# Patient Record
Sex: MALE | Race: WHITE | Employment: OTHER | URBAN - METROPOLITAN AREA
[De-identification: names, ages, dates, MRNs, and addresses within clinical notes are randomized per-mention and may not be internally consistent; named-entity substitution may affect disease eponyms.]

---

## 2017-03-28 ENCOUNTER — HOSPITAL ENCOUNTER (OUTPATIENT)
Dept: LAB | Age: 77
Discharge: HOME | End: 2017-03-28

## 2017-03-28 ENCOUNTER — OFFICE VISIT (OUTPATIENT)
Dept: DERMATOLOGY | Facility: AMBULATORY SURGERY CENTER | Age: 77
End: 2017-03-28

## 2017-03-28 VITALS
HEART RATE: 64 BPM | OXYGEN SATURATION: 97 % | HEIGHT: 70 IN | SYSTOLIC BLOOD PRESSURE: 104 MMHG | WEIGHT: 195 LBS | RESPIRATION RATE: 20 BRPM | DIASTOLIC BLOOD PRESSURE: 62 MMHG | BODY MASS INDEX: 27.92 KG/M2 | TEMPERATURE: 98.2 F

## 2017-03-28 DIAGNOSIS — D18.01 CHERRY ANGIOMA: ICD-10-CM

## 2017-03-28 DIAGNOSIS — D48.5 NEOPLASM OF UNCERTAIN BEHAVIOR OF SKIN OF NECK: ICD-10-CM

## 2017-03-28 DIAGNOSIS — L57.0 ACTINIC KERATOSIS: Primary | ICD-10-CM

## 2017-03-28 DIAGNOSIS — L81.4 LENTIGINES: ICD-10-CM

## 2017-03-28 DIAGNOSIS — L82.1 SEBORRHEIC KERATOSES: ICD-10-CM

## 2017-03-28 DIAGNOSIS — D22.9 MULTIPLE BENIGN NEVI: ICD-10-CM

## 2017-03-28 DIAGNOSIS — D48.5 NEOPLASM OF UNCERTAIN BEHAVIOR OF SKIN OF BACK: ICD-10-CM

## 2017-03-28 DIAGNOSIS — L90.5 SCAR CONDITION AND FIBROSIS OF SKIN: ICD-10-CM

## 2017-03-28 DIAGNOSIS — Z85.828 HISTORY OF NONMELANOMA SKIN CANCER: ICD-10-CM

## 2017-03-28 RX ORDER — FLUOROURACIL 5 MG/G
CREAM TOPICAL DAILY
Qty: 60 G | Refills: 1 | Status: SHIPPED | OUTPATIENT
Start: 2017-03-28 | End: 2017-09-05

## 2017-03-28 NOTE — PROGRESS NOTES
Name: Jose Radford       Age: 69 y.o. Date: 3/28/2017    Chief Complaint:   Chief Complaint   Patient presents with    Skin Exam     6 Month       Subjective:    HPI  Mr. Jose Radford is a 69 y.o. male who presents for a full skin exam.  The patient's last skin exam was 6 months ago and the patient does have current complaints related to his skin. He reports multiple red areas on his forehead and sometimes scaly does not have any associated sensitivity. He is unaware of any other concerning lesions today. Patient has a history of multiple nonmelanoma skin cancers treated with various methods including Mohs surgery and curettage Mr. Jose Radford is feeling well and in his usual state of health today. The patient's pertinent skin history includes :.  History of actinic keratosis and completed photodynamic therapy February-2016. Multiple nonmelanoma skin cancers. ROS: Constitutional: Negative. Dermatological : positive for - skin lesion changes      Social History     Social History    Marital status:      Spouse name: N/A    Number of children: N/A    Years of education: N/A     Occupational History    Not on file.      Social History Main Topics    Smoking status: Former Smoker     Quit date: 6/7/1972    Smokeless tobacco: Never Used    Alcohol use No    Drug use: Not on file    Sexual activity: Not on file     Other Topics Concern    Not on file     Social History Narrative       Family History   Problem Relation Age of Onset    Cancer Sister      bladder    Cancer Brother      colon cancer    Cancer Other      breast cancer       Past Medical History:   Diagnosis Date    Hypertension     Skin cancer     BCC face, neck       Past Surgical History:   Procedure Laterality Date    HX ORTHOPAEDIC Bilateral     hip replacement    HX ORTHOPAEDIC Left     rotator cuff repair       Current Outpatient Prescriptions   Medication Sig Dispense Refill    fluoruracil (CARAC) 0.5 % topical cream Apply  to affected area daily. Apply to lesions as directed 60 g 1    CLONIDINE HCL PO Take  by mouth.  multivitamin (ONE A DAY) tablet Take 1 Tab by mouth daily.  amLODIPine-benazepril (LOTREL) 10-20 mg per capsule Take 1 Cap by mouth daily.  FLUoxetine (PROZAC) 10 mg capsule Take  by mouth daily.  guanFACINE (TENEX) 1 mg tablet Take 1 mg by mouth daily.  pravastatin (PRAVACHOL) 10 mg tablet Take  by mouth nightly.  bupivacaine-EPINEPHrine (MARCAINE-EPINEPHRINE) 0.25 %-1:200,000 soln Used for mohs surgery  Indications: LOCAL ANESTHESIA FOR PROCEDURES 1.5 mL 0    bupivacaine-EPINEPHrine (MARCAINE-EPINEPHRINE) 0.25 %-1:200,000 soln Used for Mohs surgery 3 mL 0       No Known Allergies      Objective:    Visit Vitals    /62 (BP 1 Location: Left arm, BP Patient Position: Sitting)    Pulse 64    Temp 98.2 °F (36.8 °C) (Oral)    Resp 20    Ht 5' 10\" (1.778 m)    Wt 88.5 kg (195 lb)    SpO2 97%    BMI 27.98 kg/m2       Suhail Amaro is a 69 y.o. male who appears well and in no distress. He is awake, alert, and oriented. There is no preauricular, submandibular, or cervical lymphadenopathy. A skin examination was performed including his scalp, face (including eyelids), ears, neck, chest, back, abdomen, upper extremities (including digits/nails), lower extremities, breasts, buttocks; genital skin was not examined. There are diffuse thin scaled actinic keratoses on the forehead sideburns and lateral cheeks as well as on the right helical rim and intertragic notch of the right ear. There are thin thicker scaled AK's on his forearms and hands. He will his scars on his face trunk and extremities are without evidence of lesion recurrence. There are scattered seborrheic keratoses including a few which are slightly inflamed. He has scattered cherry angiomas as well as lentigines that are located on sun exposed areas.   There are medium brown junctional nevi as well as pink intradermal nevi without concerning features. There is a 5 x 3 mm shiny pink papule in the right postauricular skin. There is a 7 x 5 mm pink shiny macule on the mid back, central, also concerning for basal cell carcinoma. There is a 9 x 7 mm pink shiny macule on the mid back, right concerning for basal cell as well. Assessment/Plan:  1. Actinic Keratoses. The diagnosis of this precancerous lesion related to sun exposure was reviewed. We discussed options including repeating photodynamic therapy as well as chemotherapeutic creams. He elects to try the chemotherapeutic cream and we discussed a regimen on his arms for daily use of Carac for 4 weeks. On his face ears and anterior scalp he will use this daily for 1 week, stopping for 3 weeks and repeating the cycle for 4 months. He was given a handout about Carac. He was also encouraged to call with questions. 2.Seborrheic keratoses. The diagnosis was reviewed and the patient was reassured that no treatment is needed for these benign lesions. 3. Solar lentigos. The diagnosis and relationship to sun exposure was reviewed. Sun protection advised. 4. Cherry angiomas. The diagnosis was reviewed and the patient was reassured that no treatment is needed for these benign lesions. 5. Normal nevi. The diagnosis of normal nevi was reviewed. I discussed sun protection, sunscreen use, the warning signs of skin cancer, the need for self-skin examinations, and the need for regular practitioner exams every 6 months. The patient should follow up sooner as needed if new, changing, or symptomatic skin lesions arise. 6. Personal history of skin cancer. I discussed sun protection, sunscreen use, the warning signs of skin cancer, the need for self-skin examinations, and the need for regular practitioner exams every 6 months. The patient should follow up sooner as needed if new, changing, or symptomatic skin lesions arise.   7/. Neoplasm of Uncertain Behavior, right postauricular skin, favor BCC. The differential diagnoses were discussed. A shave biopsy was advised to sample this lesion. The procedure was reviewed and verbal and written consent were obtained. The risks of pain, bleeding, infection, and scar were discussed. The patient is aware that this is a sample and is intended for diagnosis and not therapy of the skin lesion. I performed the procedure. The site was cleansed and anesthetized with 1% Lidocaine with Epinephrine 1:100,000. A shave biopsy was performed to sample the lesion. Drysol was used for hemostasis. The wound was bandaged and care reviewed. The specimen was sent to pathology. I will contact the patient with the results and any further treatment that may be necessary. Mohs if positive. 8. Neoplasm of Uncertain Behavior, mid back, central.  The differential diagnoses were discussed. A shave biopsy followed by curettage was advised to address this lesion with malignant destruction. The procedure was reviewed and verbal and written consent were obtained. The risks of pain, bleeding, infection, scar, and recurrence of the lesion were discussed. I performed the procedure. The site was cleansed and anesthetized with 1% Lidocaine with Epinephrine 1:100,000. A shave biopsy was performed to remove a portion of the lesion for pathology followed by curettage of the base and a 2 mm margin, resulting in a post curettage defect size of 11 x 9 mm. Drysol was used for hemostasis. The wound was bandaged and care reviewed. The specimen was sent to pathology. I will contact the patient with the results and any further treatment that may be necessary. 9. Neoplasm of Uncertain Behavior, mid back, right. The differential diagnoses were discussed. A shave biopsy followed by curettage was advised to address this lesion with malignant destruction. The procedure was reviewed and verbal and written consent were obtained.   The risks of pain, bleeding, infection, scar, and recurrence of the lesion were discussed. I performed the procedure. The site was cleansed and anesthetized with 1% Lidocaine with Epinephrine 1:100,000. A shave biopsy was performed to remove a portion of the lesion for pathology followed by curettage of the base and a 2 mm margin, resulting in a post curettage defect size of 13 x 11 mm. Drysol was used for hemostasis. The wound was bandaged and care reviewed. The specimen was sent to pathology. I will contact the patient with the results and any further treatment that may be necessary. [facility]   OFFICE PROCEDURE PROGRESS NOTE   Chart reviewed for the following:   Raul CLINTON, have reviewed the History, Physical and updated the Allergic reactions for Janalee Meigs. TIME OUT performed immediately prior to start of procedure:   Griselda CLINTON, have performed the following reviews on Janalee Meigs   prior to the start of the procedure:     * Patient was identified by name and date of birth   * Agreement on procedure being performed was verified   * Risks and Benefits explained to the patient   * Procedure site verified and marked as necessary   * Patient was positioned for comfort   * Consent was signed and verified     Time: 0612  Date of procedure: 3/28/2017  Procedure performed by: Susie Molina  Provider assisted by: lpn   Patient assisted by: self   How tolerated by patient: tolerated the procedure well with no complications   Comments: none

## 2017-03-28 NOTE — MR AVS SNAPSHOT
Visit Information Date & Time Provider Department Dept. Phone Encounter #  
 3/28/2017 10:00 AM AANLILIA Salgado [id number] [phone] Your Appointments 10/2/2017 10:30 AM  
Office Visit with ANALILIA Salgado [address] [facility]) Appt Note: est pt; 6 mo skin exam  
 [facility] [address] [address] Upcoming Health Maintenance Date Due DTaP/Tdap/Td series (1 - Tdap) 3/6/1962 ZOSTER VACCINE AGE 60> 3/6/2001 GLAUCOMA SCREENING Q2Y 3/6/2006 Pneumococcal 65+ Low/Medium Risk (1 of 2 - PCV13) 3/6/2006 MEDICARE YEARLY EXAM 3/6/2006 INFLUENZA AGE 9 TO ADULT 8/1/2016 Allergies as of 3/28/2017  Review Complete On: 3/28/2017 By: Tabitha Ferguson LPN No Known Allergies Current Immunizations  Never Reviewed No immunizations on file. Not reviewed this visit Vitals BP Pulse Temp Resp Height(growth percentile) Weight(growth percentile) 104/62 (BP 1 Location: Left arm, BP Patient Position: Sitting) 64 98.2 °F (36.8 °C) (Oral) 20 5' 10\" (1.778 m) 195 lb (88.5 kg) SpO2 BMI Smoking Status 97% 27.98 kg/m2 Former Smoker Vitals History BMI and BSA Data Body Mass Index Body Surface Area  
 27.98 kg/m 2 2.09 m 2 Preferred Pharmacy Pharmacy Name Phone [id number] [place], [address] [place] [address]) [phone] Your Updated Medication List  
  
   
This list is accurate as of: 3/28/17 10:04 AM.  Always use your most recent med list.  
  
  
  
  
 * bupivacaine-EPINEPHrine 0.25 %-1:200,000 Soln Commonly known as:  Kiesha [place] Used for Mohs surgery * bupivacaine-EPINEPHrine 0.25 %-1:200,000 Soln Commonly known as:  Kiesha [place] Used for mohs surgery  Indications: LOCAL ANESTHESIA FOR PROCEDURES  
  
 CLONIDINE HCL PO Take  by mouth.  
  
 guanFACINE 1 mg tablet Commonly known as:  Jondiva Freestone Take 1 mg by mouth daily. LOTREL 10-20 mg per capsule Generic drug:  amLODIPine-benazepril Take 1 Cap by mouth daily. multivitamin tablet Commonly known as:  ONE A DAY Take 1 Tab by mouth daily. pravastatin 10 mg tablet Commonly known as:  PRAVACHOL Take  by mouth nightly. PROzac 10 mg capsule Generic drug:  FLUoxetine Take  by mouth daily. * Notice: This list has 2 medication(s) that are the same as other medications prescribed for you. Read the directions carefully, and ask your doctor or other care provider to review them with you. Patient Instructions Self Skin Exam and Sunscreens Early detection and treatment is essential in the treatment of all forms of skin cancer. If caught early, all forms of skin cancer are curable. In addition to your regular visits, you should perform a monthly skin examination. Over time, you become familiar with what is normally found on your skin and can identify new or suspicious spots. One of the screening tools you can use to assess your skin is to follow the ABCDEs: 
 
A= Asymmetry (One half is unlike the other half) B= Border (An irregular, scalloped or poorly defined edge) C= Color (Is varied from one area to another, has shades of tan, brown/ black,       white, red or blue) D= Diameter (Spots larger than 6mm or a pencil eraser) E= Evolving (New spots or one that is changing in size, shape, or color) A follow- up interval will be customized based on your history of skin cancer or level of skin damage and risk factors. In any case, if you notice a suspicious or new spot, an appointment should be arranged between regular visits.  
 
Everyone should use sunscreen and sun-safe practices, which is especially important for those with a personal or family history of skin cancer. Suggestions for this include: 1. Use daily moisturizers containing SPF 30 or higher. 2. Wear long sleeve clothing with UPF ratings and a broad-brimmed hat. 3. Apply sunscreen with SPF 30 or higher to all sun exposed areas if you are going to be in the sun. A broad spectrum UVA/ UVB sunscreen is best.  Dont forget to REAPPLY every two hours or more often if swimming or sweating! 4. Avoid outside activities during peak sun hours, especially in the summer (10am- 2pm). 5. DO NOT use tanning beds. Using sunscreen and sun-safe practices can help reduce the likelihood of developing skin cancer or additional skin cancers in those previously diagnosed. Introducing [facility]! Dear Annamaria James: 
Thank you for requesting a [employer] account. Our records indicate that you already have an active [employer] account. You can access your account anytime at [email or web address] Did you know that you can access your hospital and ER discharge instructions at any time in [employer]? You can also review all of your test results from your hospital stay or ER visit. Additional Information If you have questions, please visit the Frequently Asked Questions section of the [employer] website at [email or web address] Remember, [employer] is NOT to be used for urgent needs. For medical emergencies, dial 911. Now available from your iPhone and Android! Please provide this summary of care documentation to your next provider. Your primary care clinician is listed as Scott Rust. If you have any questions after today's visit, please call [phone].

## 2017-03-28 NOTE — PATIENT INSTRUCTIONS
Self Skin Exam and Sunscreens    Early detection and treatment is essential in the treatment of all forms of skin cancer. If caught early, all forms of skin cancer are curable. In addition to your regular visits, you should perform a monthly skin examination. Over time, you become familiar with what is normally found on your skin and can identify new or suspicious spots. One of the screening tools you can use to assess your skin is to follow the ABCDEs:    A= Asymmetry (One half is unlike the other half)     B= Border (An irregular, scalloped or poorly defined edge)    C= Color (Is varied from one area to another, has shades of tan, brown/ black,       white, red or blue)    D= Diameter (Spots larger than 6mm or a pencil eraser)    E= Evolving (New spots or one that is changing in size, shape, or color)    A follow- up interval will be customized based on your history of skin cancer or level of skin damage and risk factors. In any case, if you notice a suspicious or new spot, an appointment should be arranged between regular visits. Everyone should use sunscreen and sun-safe practices, which is especially important for those with a personal or family history of skin cancer. Suggestions for this include:    1. Use daily moisturizers containing SPF 30 or higher. 2. Wear long sleeve clothing with UPF ratings and a broad-brimmed hat. 3. Apply sunscreen with SPF 30 or higher to all sun exposed areas if you are going to be in the sun. A broad spectrum UVA/ UVB sunscreen is best.  Dont forget to REAPPLY every two hours or more often if swimming or sweating! 4. Avoid outside activities during peak sun hours, especially in the summer (10am- 2pm). 5. DO NOT use tanning beds. Using sunscreen and sun-safe practices can help reduce the likelihood of developing skin cancer or additional skin cancers in those previously diagnosed.

## 2017-03-28 NOTE — PROGRESS NOTES
Mr. Claribel Segovia comes in for follow-up. He is a 74-year old male with a history of actinic keratosis and multiple nonmelanoma skin cancers. He notices raised red dry spots across his forehead, similar lesions on his forearms and hands. He is feeling well and in his usual state of health today. He has no pain, no current illnesses, no other skin concerns. His allergies medications medical and social history are reviewed by me today. I have reviewed the history, physical exam, assessment and plan by Page Pedraza NP and agree. He is awake alert gentleman who appears well. I examined his frontal scalp, face, ears, neck, back chest arms and hands. He has thin actinic keratoses which are erythematous on his forehead temples ears forearms and hands. He has 2 shiny pearly papules on his mid back concerning for new basal cell carcinomas. There is a shiny papule behind his right ear also concerning for new basal cell carcinoma. He has multiple well-healed surgical sites without evidence of recurrence of previously treated skin cancers. We discussed actinic keratoses and management strategies. He is a candidate for field therapy using topical 5 fluorouracil. Biopsy followed by curettage was performed for suspected new basal cell carcinomas on his back. Biopsy was performed in his right postauricular area, concerning for new basal cell carcinoma, and this would be treated with Mohs surgery if confirmed. Follow-up and further treatment will be based on the pathology reports.

## 2017-04-10 ENCOUNTER — OFFICE VISIT (OUTPATIENT)
Dept: DERMATOLOGY | Facility: AMBULATORY SURGERY CENTER | Age: 77
End: 2017-04-10

## 2017-04-10 VITALS
BODY MASS INDEX: 27.92 KG/M2 | TEMPERATURE: 98.6 F | OXYGEN SATURATION: 98 % | RESPIRATION RATE: 16 BRPM | HEART RATE: 54 BPM | WEIGHT: 195 LBS | DIASTOLIC BLOOD PRESSURE: 70 MMHG | HEIGHT: 70 IN | SYSTOLIC BLOOD PRESSURE: 124 MMHG

## 2017-04-10 DIAGNOSIS — C44.41 BASAL CELL CARCINOMA OF RIGHT SIDE OF NECK: Primary | ICD-10-CM

## 2017-04-10 DIAGNOSIS — L90.5 SCAR CONDITION AND FIBROSIS OF SKIN: ICD-10-CM

## 2017-04-10 RX ORDER — BUPIVACAINE HYDROCHLORIDE AND EPINEPHRINE 2.5; 5 MG/ML; UG/ML
INJECTION, SOLUTION INFILTRATION; PERINEURAL
Qty: 1.5 ML | Refills: 0
Start: 2017-04-10 | End: 2017-08-24

## 2017-04-10 RX ORDER — LIDOCAINE HYDROCHLORIDE AND EPINEPHRINE 10; 10 MG/ML; UG/ML
1.5 INJECTION, SOLUTION INFILTRATION; PERINEURAL ONCE
Qty: 1 VIAL | Refills: 0
Start: 2017-04-10 | End: 2017-04-10

## 2017-04-10 NOTE — PATIENT INSTRUCTIONS
WOUND CARE INSTRUCTIONS    1. Keep the dressing clean and dry and do not remove for 48 hours. Bandage can be removed on Wednesday. 2. Then change the dressing once a day as follows:  a. Wash hands before and after each dressing change. b. Remove dressing and wash site gently with mild soap and water, rinse, and pat dry.  c. Apply an ointment (Bacitracin, Polysporin, Neosporin, Petroleum jelly or Aquaphor). d. Apply a non-stick (Telfa) dressing or Band-Aid to cover the wound. 3. Watch for:  BLEEDING: A small amount of drainage may occur. If bleeding occurs, elevate and rest the surgery site. Apply gauze and steady pressure for 15 minutes. If bleeding continues, call this office. INFECTION: Signs of infection include increased redness, pain, warmth, drainage of pus, and fever. If this occurs, call this office. 4. Special Instructions (follow any that are checked):  · [] You have stitches that need to be removed in 0 days  · [x] Avoid bending at the waist and heavy lifting for two days. · [x] Sleep with your head elevated for the next two nights. · [] Rest the surgery site and keep it elevated as much as possible for two days. · [] You may apply an ice-pack for 10-15 minutes every waking hour for the rest of the day. · [] Eat a soft diet and avoid hot food and hot drinks for the rest of the day. · [] Other instructions: Follow up as directed  **Take Tylenol for pain as needed. Once the site is healed with no remaining bandages or open areas, protect your surgical site and scar from the sun, as this area will be more sensitive. Use a broad spectrum sunscreen SPF 30 or higher daily, and a chemical free product (one containing zinc oxide or titanium dioxide) is a good choice if the area is sensitive. You may begin to gently massage the surgical site in 2-3 weeks, rubbing in a circular motion along the scar. This can help reduce swelling and thickness of a scar.  A scar cream may be used beginnning 1 month after the surgery. If you have any questions or concerns, please call our office Monday through Friday at [phone].

## 2017-04-10 NOTE — PROGRESS NOTES
This note is written by Diogo Verma, as dictated by Edith Dickerson MD.    CC: Basal cell carcinoma on the right postauricular    History of present illness:     Dilcia Avilez is a 69 y.o. male referred by Denise Trevino DNP. 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 This is a new basal cell carcinoma present for less than 6 months, no pain, bleeding, itching, or crusting and no prior treatment. Jazmin Morin first noted the lesion during a routine skin scan. Biopsy confirmed the diagnosis of basal cell carcinoma, and I reviewed the written pathology report. He is feeling well and in his usual state of health today. He has no pain, no current illnesses, no other skin concerns. His allergies, medications, medical, and social history are reviewed by me today. He has a history of numerous nonmelanoma skin cancers treated by me over 5 years. Griselda curetted two lesions on his back at his appointment on 03/28/2017. He reports his wife has concerns over how these lesions are healing. Exam:     He is an awake, alert, and oriented 69 y.o. male who appears well and in no distress. There is no preauricular, submandibular, or cervical lymphadenopathy. I examined his face and neck. He has an indistinct patch on his right postauricular with an 8 x 4 mm shiny pink papule separate from prior well-healed surgical scars. Location confirmed by biopsy photo. The curettage sites on his back are healing well without evidence of infection. Assessment/plan:    1. Basal cell carcinoma, right postauricular. I discussed the diagnosis of basal cell carcinoma and summarized the pathology report. Mohs surgery is indicated by site, size, and poor definition. The procedure was discussed, verbal and written consent were obtained. I performed the procedure. One stage was required to reach a tumor free plane. The surgical defect was managed with intermediate repair. There were no complications. He will follow up as needed as the site heals. Indications, risks, and options were discussed with Db Fish preoperatively. Risks including, but not limited to: pain, bleeding, infection, tumor recurrence, scarring and damage to motor and/or sensory nerves, were discussed. Db Fish chose Mohs surgery. Db Fish was an acceptable surgery candidate. Db Fish was placed in the appropriate position on the operating table in the Mohs surgery procedure room. The area was prepped and draped in the standard manner. Gentian violet was used to outline the clinical margins of the tumor. Local anesthesia was then obtained. The grossly visible tumor was then removed, an underlying layer was excised and mapped according to the Mohs technique, and the individual specimens examined microscopically. The process was repeated until microscopic examination of the tissue specimens confirmed a tumor-free plane. Hemostasis was obtained with electrosurgery and pressure. The wound was covered between stages with moist saline gauze. Wound care instructions (written and verbal) and a follow up appointment were given to Db Fish before discharge. Db Fish was discharged in good condition. The wound management options of second intent healing, layered closure, local flap, and/or full thickness skin graft were discussed. Db Fish understands the aims, risks, alternatives, and possible complications and elects to proceed with an intermediate layered closure. Wound margins were made vertical, edges undermined in the subcutaneous plane, standing cones corrected at both poles followed by layered closure. The wound was closed with buried 5-0 polysorb suture in the subcutis to reduce tension on the skin edges, and skin edges were approximated with 5-0 polysorb suture in the dermis to reduce tension on the epidermis. Skin glue was used to further approximate skin edges.     The final closure length was 20 mm. The wound was bandaged with a pressure bandage utilizing Petroleum ointment, Telfa, gauze and Coverroll. Wound care instructions (written and verbal) and a follow up appointment were given to Bruce Teran before discharge. Bruce Teran was discharged in good condition. 2. Wound check. The curettage sites are healing well. The patient was reassured that there is no evidence of infection. Wound care instructions reviewed - healing time of an additional week expected. Patient should follow up as needed. 3. History of nonmelanoma skin cancer. I discussed the diagnosis and recommend routine examinations with Angelena Kayser, DNP, for surveillance. Next appt in Oct is scheduled. The documentation recorded by the scribe accurately reflects the service I personally performed and the decisions made by me. [facility]   OFFICE PROCEDURE PROGRESS NOTE     Chart reviewed for the following:     Lorena Suarez MD, have reviewed the History, Physical and updated the Allergic reactions for Rock Alfredo performed immediately prior to start of procedure:     Lorena Suarez MD, have performed the following reviews on Bruce Teran prior to the start of the procedure:     * Patient was identified by name and date of birth   * Agreement on procedure being performed was verified   * Risks and Benefits explained to the patient   * Procedure site verified and marked as necessary   * Patient was positioned for comfort   * Consent was signed and verified     Time: 8:20 AM  Date of procedure: 4/10/2017  Procedure performed by: Yeison Almazan.  Rickey Suarez MD   Provider assisted by: LPN   Patient assisted by: self   How tolerated by patient: tolerated the procedure well with no complications   Comments: none

## 2017-04-10 NOTE — MR AVS SNAPSHOT
Visit Information Date & Time Provider Department Dept. Phone Encounter #  
 4/10/2017  8:15 AM MD Ritchie Appiah57 [phone] [id number] Your Appointments 10/2/2017 10:30 AM  
Office Visit with ANALILIA Marley [facility]) Appt Note: est pt; 6 mo skin exam  
 [facility] A [address] [address] Upcoming Health Maintenance Date Due DTaP/Tdap/Td series (1 - Tdap) 3/6/1962 ZOSTER VACCINE AGE 60> 3/6/2001 GLAUCOMA SCREENING Q2Y 3/6/2006 Pneumococcal 65+ Low/Medium Risk (1 of 2 - PCV13) 3/6/2006 MEDICARE YEARLY EXAM 3/6/2006 INFLUENZA AGE 9 TO ADULT 8/1/2016 Allergies as of 4/10/2017  Review Complete On: 4/10/2017 By: Lynette Mckay LPN No Known Allergies Current Immunizations  Never Reviewed No immunizations on file. Not reviewed this visit Vitals BP Pulse Temp Resp Height(growth percentile) Weight(growth percentile) 124/70 (!) 54 98.6 °F (37 °C) (Oral) 16 5' 10\" (1.778 m) 195 lb (88.5 kg) SpO2 BMI Smoking Status 98% 27.98 kg/m2 Former Smoker Vitals History BMI and BSA Data Body Mass Index Body Surface Area  
 27.98 kg/m 2 2.09 m 2 Preferred Pharmacy Pharmacy Name Phone [facility] [address] [address] [phone] Your Updated Medication List  
  
   
This list is accurate as of: 4/10/17  8:37 AM.  Always use your most recent med list.  
  
  
  
  
 * bupivacaine-EPINEPHrine 0.25 %-1:200,000 Soln Commonly known as:  Mollie Gross Used for Mohs surgery * bupivacaine-EPINEPHrine 0.25 %-1:200,000 Soln Commonly known as:  Mollie Gross Used for mohs surgery  Indications: LOCAL ANESTHESIA FOR PROCEDURES  
  
 CLONIDINE HCL PO Take  by mouth. fluoruracil 0.5 % topical cream  
Commonly known as:  Vonne [place] Apply  to affected area daily. Apply to lesions as directed  
  
 guanFACINE 1 mg tablet Commonly known as:  Meme [place] Take 1 mg by mouth daily. LOTREL 10-20 mg per capsule Generic drug:  amLODIPine-benazepril Take 1 Cap by mouth daily. multivitamin tablet Commonly known as:  ONE A DAY Take 1 Tab by mouth daily. pravastatin 10 mg tablet Commonly known as:  PRAVACHOL Take  by mouth nightly. PROzac 10 mg capsule Generic drug:  FLUoxetine Take  by mouth daily. * Notice: This list has 2 medication(s) that are the same as other medications prescribed for you. Read the directions carefully, and ask your doctor or other care provider to review them with you. Patient Instructions WOUND CARE INSTRUCTIONS 1. Keep the dressing clean and dry and do not remove for 48 hours. Bandage can be removed on Wednesday. 2. Then change the dressing once a day as follows: 
a. Wash hands before and after each dressing change. b. Remove dressing and wash site gently with mild soap and water, rinse, and pat dry. 
c. Apply an ointment (Bacitracin, Polysporin, Neosporin, Petroleum jelly or Aquaphor). d. Apply a non-stick (Telfa) dressing or Band-Aid to cover the wound. 3. Watch for: BLEEDING: A small amount of drainage may occur. If bleeding occurs, elevate and rest the surgery site. Apply gauze and steady pressure for 15 minutes. If bleeding continues, call this office. INFECTION: Signs of infection include increased redness, pain, warmth, drainage of pus, and fever. If this occurs, call this office. 4. Special Instructions (follow any that are checked): ·  You have stitches that need to be removed in 0 days ·  Avoid bending at the waist and heavy lifting for two days. ·  Sleep with your head elevated for the next two nights. ·  Rest the surgery site and keep it elevated as much as possible for two days. ·  You may apply an ice-pack for 10-15 minutes every waking hour for the rest of the day. ·  Eat a soft diet and avoid hot food and hot drinks for the rest of the day. ·  Other instructions: Follow up as directed **Take Tylenol for pain as needed. Once the site is healed with no remaining bandages or open areas, protect your surgical site and scar from the sun, as this area will be more sensitive. Use a broad spectrum sunscreen SPF 30 or higher daily, and a chemical free product (one containing zinc oxide or titanium dioxide) is a good choice if the area is sensitive. You may begin to gently massage the surgical site in 2-3 weeks, rubbing in a circular motion along the scar. This can help reduce swelling and thickness of a scar. A scar cream may be used beginnning 1 month after the surgery. If you have any questions or concerns, please call our office Monday through Friday at [phone]. Introducing [facility] & HEALTH SERVICES! Dear Kiel Mercado: 
Thank you for requesting a [employer] account. Our records indicate that you already have an active [employer] account. You can access your account anytime at [email or web address] Did you know that you can access your hospital and ER discharge instructions at any time in [employer]? You can also review all of your test results from your hospital stay or ER visit. Additional Information If you have questions, please visit the Frequently Asked Questions section of the [employer] website at [email or web address] Remember, [employer] is NOT to be used for urgent needs. For medical emergencies, dial 911. Now available from your iPhone and Android! Please provide this summary of care documentation to your next provider. Your primary care clinician is listed as Melvin Awad.  If you have any questions after today's visit, please call [phone].

## 2017-04-10 NOTE — PROGRESS NOTES
Pre-op: Patient present today for the evaluation of Basal Cell carcinoma to the Right postauricular. Procedure explained with full understanding. Vitals:    04/10/17 0813   BP: 124/70   Pulse: (!) 54   Resp: 16   Temp: 98.6 °F (37 °C)   TempSrc: Oral   SpO2: 98%   Weight: 88.5 kg (195 lb)   Height: 5' 10\" (1.778 m)     preoperatively, will continue to monitor. Post-op: Written and verbal post-op wound care instructions given to patient with full understanding of care. Surgical wound bandaged with Vaseline, Telfa, skin prep, 2x2 gauze, and coverall tape. All questions and concerns addressed. Vitals stable postoperatively.

## 2017-08-24 ENCOUNTER — HOSPITAL ENCOUNTER (OUTPATIENT)
Dept: LAB | Age: 77
Discharge: HOME | End: 2017-08-24

## 2017-08-24 ENCOUNTER — OFFICE VISIT (OUTPATIENT)
Dept: DERMATOLOGY | Facility: AMBULATORY SURGERY CENTER | Age: 77
End: 2017-08-24

## 2017-08-24 VITALS
SYSTOLIC BLOOD PRESSURE: 125 MMHG | RESPIRATION RATE: 18 BRPM | WEIGHT: 195 LBS | TEMPERATURE: 98.8 F | OXYGEN SATURATION: 93 % | DIASTOLIC BLOOD PRESSURE: 65 MMHG | BODY MASS INDEX: 27.92 KG/M2 | HEIGHT: 70 IN | HEART RATE: 67 BPM

## 2017-08-24 DIAGNOSIS — L82.1 SEBORRHEIC KERATOSES: ICD-10-CM

## 2017-08-24 DIAGNOSIS — L81.4 LENTIGINES: ICD-10-CM

## 2017-08-24 DIAGNOSIS — R21 RASH AND OTHER NONSPECIFIC SKIN ERUPTION: ICD-10-CM

## 2017-08-24 DIAGNOSIS — M25.511 ACUTE PAIN OF RIGHT SHOULDER: ICD-10-CM

## 2017-08-24 DIAGNOSIS — D48.5 NEOPLASM OF UNCERTAIN BEHAVIOR OF SKIN OF SHOULDER: Primary | ICD-10-CM

## 2017-08-24 DIAGNOSIS — L29.9 PRURITUS: ICD-10-CM

## 2017-08-24 PROCEDURE — 88342 IMHCHEM/IMCYTCHM 1ST ANTB: CPT | Performed by: NURSE PRACTITIONER

## 2017-08-24 RX ORDER — FLUOXETINE HYDROCHLORIDE 20 MG/1
CAPSULE ORAL
COMMUNITY
Start: 2017-08-22 | End: 2017-09-01 | Stop reason: SDUPTHER

## 2017-08-24 RX ORDER — TRIAMCINOLONE ACETONIDE 1 MG/G
OINTMENT TOPICAL 2 TIMES DAILY
Qty: 30 G | Refills: 1 | Status: SHIPPED | OUTPATIENT
Start: 2017-08-24 | End: 2017-09-01 | Stop reason: ALTCHOICE

## 2017-08-24 NOTE — MR AVS SNAPSHOT
Visit Information Date & Time Provider Department Dept. Phone Encounter #  
 8/24/2017  3:30 PM ANALILIA Rai 8057 [phone] Your Appointments 10/2/2017 10:30 AM  
Office Visit with ANALILIA Rai [address]057 [id number] [facility]) Appt Note: est pt; 6 mo skin exam  
 [facility] A [address] [address] Upcoming Health Maintenance Date Due DTaP/Tdap/Td series (1 - Tdap) 3/6/1962 ZOSTER VACCINE AGE 60> 1/6/2001 GLAUCOMA SCREENING Q2Y 3/6/2006 Pneumococcal 65+ Low/Medium Risk (1 of 2 - PCV13) 3/6/2006 MEDICARE YEARLY EXAM 3/6/2006 INFLUENZA AGE 9 TO ADULT 8/1/2017 Allergies as of 8/24/2017  Review Complete On: 8/24/2017 By: Ted Billings LPN No Known Allergies Current Immunizations  Never Reviewed No immunizations on file. Not reviewed this visit Vitals BP Pulse Temp Resp Height(growth percentile) Weight(growth percentile) 125/65 (BP 1 Location: Left arm, BP Patient Position: Sitting) 67 98.8 °F (37.1 °C) (Oral) 18 5' 10\" (1.778 m) 195 lb (88.5 kg) SpO2 BMI Smoking Status 93% 27.98 kg/m2 Former Smoker BMI and BSA Data Body Mass Index Body Surface Area  
 27.98 kg/m 2 2.09 m 2 Preferred Pharmacy Pharmacy Name Phone [facility] [address] [address] [facility], [address] [facility] [phone] Your Updated Medication List  
  
   
This list is accurate as of: 8/24/17  3:34 PM.  Always use your most recent med list.  
  
  
  
  
 * bupivacaine-EPINEPHrine 0.25 %-1:200,000 Soln Commonly known as:  Ilir Fong Used for Mohs surgery * bupivacaine-EPINEPHrine 0.25 %-1:200,000 Soln Commonly known as:  Ilir Fong Used for mohs surgery  Indications: LOCAL ANESTHESIA FOR PROCEDURES  
  
 * bupivacaine-EPINEPHrine 0.25 %-1:200,000 Soln Commonly known as:  Gilma Sunshine Used for mohs surgery  Indications: LOCAL ANESTHESIA FOR PROCEDURES  
  
 CLONIDINE HCL PO Take  by mouth. fluoruracil 0.5 % topical cream  
Commonly known as:  Aron Fuel Apply  to affected area daily. Apply to lesions as directed  
  
 guanFACINE IR 1 mg IR tablet Commonly known as:  Mertie Mettle Take 1 mg by mouth daily. LOTREL 10-20 mg per capsule Generic drug:  amLODIPine-benazepril Take 1 Cap by mouth daily. multivitamin tablet Commonly known as:  ONE A DAY Take 1 Tab by mouth daily. pravastatin 10 mg tablet Commonly known as:  PRAVACHOL Take  by mouth nightly. * FLUoxetine 20 mg capsule Commonly known as:  PROzac * PROzac 10 mg capsule Generic drug:  FLUoxetine Take  by mouth daily. * Notice: This list has 5 medication(s) that are the same as other medications prescribed for you. Read the directions carefully, and ask your doctor or other care provider to review them with you. Patient Instructions Self Skin Exam and Sunscreens Early detection and treatment is essential in the treatment of all forms of skin cancer. If caught early, all forms of skin cancer are curable. In addition to your regular visits, you should perform a monthly skin examination. Over time, you become familiar with what is normally found on your skin and can identify new or suspicious spots. One of the screening tools you can use to assess your skin is to follow the ABCDEs: 
 
A= Asymmetry (One half is unlike the other half) B= Border (An irregular, scalloped or poorly defined edge) C= Color (Is varied from one area to another, has shades of tan, brown/ black,       white, red or blue) D= Diameter (Spots larger than 6mm or a pencil eraser) E= Evolving (New spots or one that is changing in size, shape, or color) A follow- up interval will be customized based on your history of skin cancer or level of skin damage and risk factors. In any case, if you notice a suspicious or new spot, an appointment should be arranged between regular visits. Everyone should use sunscreen and sun-safe practices, which is especially important for those with a personal or family history of skin cancer. Suggestions for this include: 1. Use daily moisturizers containing SPF 30 or higher. 2. Wear long sleeve clothing with UPF ratings and a broad-brimmed hat. 3. Apply sunscreen with SPF 30 or higher to all sun exposed areas if you are going to be in the sun. A broad spectrum UVA/ UVB sunscreen is best.  Dont forget to REAPPLY every two hours or more often if swimming or sweating! 4. Avoid outside activities during peak sun hours, especially in the summer (10am- 2pm). 5. DO NOT use tanning beds. Using sunscreen and sun-safe practices can help reduce the likelihood of developing skin cancer or additional skin cancers in those previously diagnosed. Introducing [facility] & HEALTH SERVICES! Dear Moira Nj: 
Thank you for requesting a [employer] account. Our records indicate that you already have an active [employer] account. You can access your account anytime at [email or web address] Did you know that you can access your hospital and ER discharge instructions at any time in [employer]? You can also review all of your test results from your hospital stay or ER visit. Additional Information If you have questions, please visit the Frequently Asked Questions section of the [employer] website at [email or web address] Remember, [employer] is NOT to be used for urgent needs. For medical emergencies, dial 911. Now available from your iPhone and Android! Please provide this summary of care documentation to your next provider. Your primary care clinician is listed as Marvin Atkins. If you have any questions after today's visit, please call [phone].

## 2017-08-24 NOTE — PROGRESS NOTES
Name: Omar Barreto       Age: 69 y.o. Date: 8/24/2017    Chief Complaint:   Chief Complaint   Patient presents with    Skin Exam     right shoulder        Subjective:    HPI:  Mr.. Omar Barreto is a 69 y.o. male who presents for the evaluation of a lesion on the right shoulder. He states that the lesion appeared 1 months ago. The patient has not had prior treatment for this lesion other than application of Aquaphor at home  Associated symptoms include scaling, pain. His wife has also noted a white area on the right cheek for which she was suspicious. He also reports chronic itching on the left side of his upper back - no specific rash, always in the same place. 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 ROS: Consitutional: Negative  Dermatological : positive for - rash and pain      Social History     Social History    Marital status:      Spouse name: N/A    Number of children: N/A    Years of education: N/A     Occupational History    Not on file.      Social History Main Topics    Smoking status: Former Smoker     Quit date: 6/7/1972    Smokeless tobacco: Never Used    Alcohol use No    Drug use: Not on file    Sexual activity: Not on file     Other Topics Concern    Not on file     Social History Narrative       Family History   Problem Relation Age of Onset    Cancer Sister      bladder    Cancer Brother      colon cancer    Cancer Other      breast cancer       Past Medical History:   Diagnosis Date    Hypertension     Skin cancer     BCC face, neck       Past Surgical History:   Procedure Laterality Date    HX MOHS PROCEDURES  04/10/2017    BCC right postauricular by Dr. Hernandez Grier HX ORTHOPAEDIC Bilateral     hip replacement    HX ORTHOPAEDIC Left     rotator cuff repair       Current Outpatient Prescriptions   Medication Sig Dispense Refill    FLUoxetine (PROZAC) 20 mg capsule       triamcinolone acetonide (KENALOG) 0.1 % ointment Apply  to affected area two (2) times a day. use thin layer 30 g 1    fluoruracil (CARAC) 0.5 % topical cream Apply  to affected area daily. Apply to lesions as directed 60 g 1    CLONIDINE HCL PO Take  by mouth.  multivitamin (ONE A DAY) tablet Take 1 Tab by mouth daily.  amLODIPine-benazepril (LOTREL) 10-20 mg per capsule Take 1 Cap by mouth daily.  guanFACINE (TENEX) 1 mg tablet Take 1 mg by mouth daily.  pravastatin (PRAVACHOL) 10 mg tablet Take  by mouth nightly.  bupivacaine-EPINEPHrine (MARCAINE-EPINEPHRINE) 0.25 %-1:200,000 soln Used for mohs surgery  Indications: LOCAL ANESTHESIA FOR PROCEDURES 1.5 mL 0    bupivacaine-EPINEPHrine (MARCAINE-EPINEPHRINE) 0.25 %-1:200,000 soln Used for mohs surgery  Indications: LOCAL ANESTHESIA FOR PROCEDURES 1.5 mL 0    bupivacaine-EPINEPHrine (MARCAINE-EPINEPHRINE) 0.25 %-1:200,000 soln Used for Mohs surgery 3 mL 0    FLUoxetine (PROZAC) 10 mg capsule Take  by mouth daily. No Known Allergies      Objective:    Visit Vitals    /65 (BP 1 Location: Left arm, BP Patient Position: Sitting)    Pulse 67    Temp 98.8 °F (37.1 °C) (Oral)    Resp 18    Ht 5' 10\" (1.778 m)    Wt 88.5 kg (195 lb)    SpO2 93%    BMI 27.98 kg/m2       Brenton Garcia is a 69 y.o. male who appears well and in no distress. He is awake, alert, and oriented. There is no preauricular, submandibular, or cervical lymphadenopathy. A limited skin examination was completed including the right cheek and upper back. There is a 3 mm hypopigmented papule on the right cheek consistent with a seborrheic keratosis. There is no rash or lesion on the left side of his back to contribute to the symptom of itching. There is a scaly pink partially confluent papular rash on the right shoulder. This appears inflammatory based but is tender to touch. There are lentigines. Assessment/Plan:  1. Rash. The potential diagnoses discussed.  A shave biopsy was advised to sample this lesion. The procedure was reviewed and verbal and written consent were obtained. The risks of pain, bleeding, infection, and scar were discussed. The patient is aware that this is a sample and is intended for diagnosis and not therapy of the skin lesion. I performed the procedure. The site was cleansed and anesthetized with 1% Lidocaine with Epinephrine 1:100,000. A shave biopsy was performed to sample the lesion. Drysol was used for hemostasis. The wound was bandaged and care reviewed. The specimen was sent to pathology. I will contact the patient with the results and any further treatment that may be necessary. I did prescribe Triamcinolone ointment for his use in the interim. 2.Seborrheic keratoses. The diagnosis was reviewed and the patient was reassured that no treatment is needed for these benign lesions. 3. Personal history of skin cancer. I discussed sun protection, sunscreen use, the warning signs of skin cancer, the need for self-skin examinations, and the need for regular practitioner exams every 6 months. The patient should follow up sooner as needed if new, changing, or symptomatic skin lesions arise. 4.Solar lentigos. The diagnosis and relationship to sun exposure was reviewed. Sun protection advised. 5. Pruritus. He may try Sarna lotion. [facility]   OFFICE PROCEDURE PROGRESS NOTE   Chart reviewed for the following:   aRul CLINTON, have reviewed the History, Physical and updated the Allergic reactions for Kluadia Deter. TIME OUT performed immediately prior to start of procedure:   Griselda CLINTON, have performed the following reviews on Klaudia Deter   prior to the start of the procedure:     * Patient was identified by name and date of birth   * Agreement on procedure being performed was verified   * Risks and Benefits explained to the patient   * Procedure site verified and marked as necessary   * Patient was positioned for comfort   * Consent was signed and verified     Time: 1600  Date of procedure: 8/24/2017  Procedure performed by: Davis Elliott.  Akil Doll  Provider assisted by: lpn   Patient assisted by: self   How tolerated by patient: tolerated the procedure well with no complications   Comments: none

## 2017-08-31 NOTE — PROGRESS NOTES
I spoke with the patient's wife and she was advised of the result. She states he has been feeling better with the triamcinolone. He has carac from earlier this year. I instructed her to use this daily for 4 weeks to the area. She expressed understanding.

## 2017-09-01 ENCOUNTER — APPOINTMENT (OUTPATIENT)
Dept: ULTRASOUND IMAGING | Age: 77
End: 2017-09-01
Attending: EMERGENCY MEDICINE
Payer: MEDICARE

## 2017-09-01 ENCOUNTER — HOSPITAL ENCOUNTER (EMERGENCY)
Age: 77
Discharge: HOME | End: 2017-09-01
Attending: EMERGENCY MEDICINE
Payer: MEDICARE

## 2017-09-01 VITALS
SYSTOLIC BLOOD PRESSURE: 159 MMHG | HEART RATE: 63 BPM | DIASTOLIC BLOOD PRESSURE: 74 MMHG | TEMPERATURE: 97.6 F | OXYGEN SATURATION: 95 % | BODY MASS INDEX: 30.99 KG/M2 | RESPIRATION RATE: 14 BRPM | WEIGHT: 209.22 LBS | HEIGHT: 69 IN

## 2017-09-01 DIAGNOSIS — L08.9 INFECTED ABRASION: ICD-10-CM

## 2017-09-01 DIAGNOSIS — S80.12XA LEG HEMATOMA, LEFT, INITIAL ENCOUNTER: Primary | ICD-10-CM

## 2017-09-01 DIAGNOSIS — T14.8XXA INFECTED ABRASION: ICD-10-CM

## 2017-09-01 PROCEDURE — 99282 EMERGENCY DEPT VISIT SF MDM: CPT

## 2017-09-01 PROCEDURE — 93971 EXTREMITY STUDY: CPT

## 2017-09-01 RX ORDER — CEPHALEXIN 500 MG/1
500 CAPSULE ORAL 4 TIMES DAILY
Qty: 28 CAP | Refills: 0 | Status: SHIPPED | OUTPATIENT
Start: 2017-09-01 | End: 2017-09-08

## 2017-09-01 NOTE — ED NOTES
The patient was discharged home by Dr Esthela Sadler in stable condition. The patient is alert and oriented, in no respiratory distress and discharge vital signs obtained. The patient's diagnosis, condition and treatment were explained. The patient expressed understanding. Prescriptions given. No work/school note given. A discharge plan has been developed. A  was not involved in the process. Aftercare instructions were given. Pt ambulatory out of the ED.

## 2017-09-01 NOTE — ED PROVIDER NOTES
Patient is a 69 y.o. male presenting with leg pain and skin problem. The history is provided by the patient. Leg Pain    This is a new problem. The current episode started yesterday. The problem occurs constantly. The problem has not changed since onset. Quality: burning. The pain is at a severity of 4/10. The symptoms are aggravated by palpation and movement. There has been a history of trauma (fell and scraped the area about 2 weeks ago). Skin Problem    This is a new problem. The current episode started more than 1 week ago. The problem has been gradually worsening. There has been no fever. The rash is present on the left lower leg. The pain is at a severity of 4/10. Associated symptoms include pain. He has tried nothing for the symptoms. Past Medical History:   Diagnosis Date    Hypertension     Skin cancer     BCC face, neck       Past Surgical History:   Procedure Laterality Date    HX MOHS PROCEDURES  04/10/2017    BCC right postauricular by Dr. Johnie Fabry Bilateral     hip replacement    HX ORTHOPAEDIC Left     rotator cuff repair         Family History:   Problem Relation Age of Onset   Peggy Barrs Cancer Sister      bladder    Cancer Brother      colon cancer    Cancer Other      breast cancer       Social History     Social History    Marital status:      Spouse name: N/A    Number of children: N/A    Years of education: N/A     Occupational History    Not on file. Social History Main Topics    Smoking status: Former Smoker     Quit date: 6/7/1972    Smokeless tobacco: Never Used    Alcohol use No    Drug use: Not on file    Sexual activity: Not on file     Other Topics Concern    Not on file     Social History Narrative         ALLERGIES: Review of patient's allergies indicates no known allergies. Review of Systems   Constitutional: Negative for chills and fever. Respiratory: Negative for chest tightness and shortness of breath.     Cardiovascular: Negative for chest pain. Gastrointestinal: Negative for abdominal pain, nausea and vomiting. Musculoskeletal:        +calf pain and lower leg swelling   Skin:        Healing abrasions with warmth noted   All other systems reviewed and are negative. Vitals:    09/01/17 1008   BP: 159/74   Pulse: 63   Resp: 14   Temp: 97.6 °F (36.4 °C)   SpO2: 95%   Weight: 94.9 kg (209 lb 3.5 oz)   Height: 5' 9\" (1.753 m)            Physical Exam   Constitutional: He is oriented to person, place, and time. He appears well-developed and well-nourished. No distress. HENT:   Head: Normocephalic and atraumatic. Eyes: Conjunctivae and EOM are normal.   Neck: Normal range of motion. Cardiovascular: Normal rate, regular rhythm and intact distal pulses. Pulmonary/Chest: Effort normal. No stridor. No respiratory distress. Musculoskeletal: Normal range of motion. He exhibits tenderness. He exhibits no edema. Left lower leg: He exhibits tenderness and swelling. He exhibits no bony tenderness, no edema and no deformity. Legs:  Neurological: He is alert and oriented to person, place, and time. No cranial nerve deficit. Skin: Skin is warm and dry. He is not diaphoretic. Small area of redness noted, skin warm to touch   Psychiatric: He has a normal mood and affect. Nursing note and vitals reviewed. MDM  Number of Diagnoses or Management Options  Infected abrasion:   Leg hematoma, left, initial encounter:   Diagnosis management comments: Leg swelling with hx of skin cancer - I have concern for DVT - get US of leg  Warm red leg - possible skin structure infection - possible hematoma from the recent trauma, no fluctuant area to make me think abscess.     Prelim Doppler report neg for DVT - will treat with ABX for one week and refer back to his PCP and dermatologist       Amount and/or Complexity of Data Reviewed  Tests in the radiology section of CPT®: ordered and reviewed    Patient Progress  Patient progress: stable    ED Course       Procedures

## 2017-09-01 NOTE — DISCHARGE INSTRUCTIONS
Hematoma: Care Instructions  Your Care Instructions  A hematoma is a bad bruise. It happens when an injury causes blood to collect and pool under the skin. The pooling blood gives the skin a spongy, rubbery, lumpy feel. A hematoma usually is not a cause for concern. It is not the same thing as a blood clot in a vein, and it does not cause blood clots. Follow-up care is a key part of your treatment and safety. Be sure to make and go to all appointments, and call your doctor if you are having problems. It's also a good idea to know your test results and keep a list of the medicines you take. How can you care for yourself at home? · Rest and protect the bruised area. · Put ice or a cold pack on the area for 10 to 20 minutes at a time. · Prop up the bruised area on a pillow when you ice it or anytime you sit or lie down during the next 3 days. Try to keep it above the level of your heart. This will help reduce swelling. · Wrapping the bruised area with an elastic bandage such as an Ace wrap will help decrease swelling. Don't wrap it too tightly, as this can cause more swelling below the affected area. · Take an over-the-counter pain medicine, such as acetaminophen (Tylenol), ibuprofen (Advil, Motrin), or naproxen (Aleve). · Do not take two or more pain medicines at the same time unless the doctor told you to. Many pain medicines have acetaminophen, which is Tylenol. Too much acetaminophen (Tylenol) can be harmful. When should you call for help? Call your doctor now or seek immediate medical care if:  · You have signs of skin infection, such as:  ¨ Increased pain, swelling, warmth, or redness. ¨ Red streaks leading from the area. ¨ Pus draining from the area. ¨ A fever. Watch closely for changes in your health, and be sure to contact your doctor if:  · The bruise lasts longer than 4 weeks. · The bruise gets bigger or becomes more painful. · You do not get better as expected.   Where can you learn more?  Go to http://kesha-silas.info/. Enter P911 in the search box to learn more about \"Hematoma: Care Instructions. \"  Current as of: March 20, 2017  Content Version: 11.3  © 8087-5974 [employer]. Care instructions adapted under license by [employer] (which disclaims liability or warranty for this information). If you have questions about a medical condition or this instruction, always ask your healthcare professional. [address] any warranty or liability for your use of this information. We hope that we have addressed all of your medical concerns. The examination and treatment you received in the Emergency Department were for an emergent problem and were not intended as complete care. It is important that you follow up with your healthcare provider(s) for ongoing care. If your symptoms worsen or do not improve as expected, and you are unable to reach your usual health care provider(s), you should return to the Emergency Department. Today's healthcare is undergoing tremendous change, and patient satisfaction surveys are one of the many tools to assess the quality of medical care. You may receive a survey from the [employer] regarding your experience in the Emergency Department. I hope that your experience has been completely positive, particularly the medical care that I provided. As such, please participate in the survey; anything less than excellent does not meet my expectations or intentions. [address] and 8 [facility] participate in nationally recognized quality of care measures. If your blood pressure is greater than 120/80, as reported below, we urge that you seek medical care to address the potential of high blood pressure, commonly known as hypertension.    Hypertension can be hereditary or can be caused by certain medical conditions, pain, stress, or \"white coat syndrome. \"       Please make an appointment with your health care provider(s) for follow up of your Emergency Department visit. VITALS:   Patient Vitals for the past 8 hrs:   Temp Pulse Resp BP SpO2   09/01/17 1008 97.6 °F (36.4 °C) 63 14 159/74 95 %          Thank you for allowing us to provide you with medical care today. We realize that you have many choices for your emergency care needs. Please choose us in the future for any continued health care needs. [place] [place], [address] [place] [address] 20.   Office: [phone]

## 2017-09-05 ENCOUNTER — OFFICE VISIT (OUTPATIENT)
Dept: DERMATOLOGY | Facility: AMBULATORY SURGERY CENTER | Age: 77
End: 2017-09-05

## 2017-09-05 VITALS
HEART RATE: 57 BPM | WEIGHT: 209.22 LBS | SYSTOLIC BLOOD PRESSURE: 148 MMHG | HEIGHT: 69 IN | BODY MASS INDEX: 30.99 KG/M2 | DIASTOLIC BLOOD PRESSURE: 70 MMHG | OXYGEN SATURATION: 96 % | RESPIRATION RATE: 18 BRPM | TEMPERATURE: 98.2 F

## 2017-09-05 DIAGNOSIS — R60.0 LOCALIZED EDEMA: ICD-10-CM

## 2017-09-05 DIAGNOSIS — D04.62: ICD-10-CM

## 2017-09-05 DIAGNOSIS — S80.12XA HEMATOMA OF LEG, LEFT, INITIAL ENCOUNTER: Primary | ICD-10-CM

## 2017-09-05 RX ORDER — FLUOROURACIL 50 MG/G
CREAM TOPICAL 2 TIMES DAILY
Qty: 40 G | Refills: 0 | Status: ON HOLD | OUTPATIENT
Start: 2017-09-05 | End: 2019-08-27

## 2017-09-05 NOTE — PROGRESS NOTES
Mr. Igor Tomas comes in for follow-up. 2 weeks ago he fell and scraped his left lower leg. He was recently seen in the emergency room on September 1 with swelling and some redness of this leg. Doppler ultrasound did not show deep venous thrombosis but did indicate an area of soft tissue enhancement on the left leg thought to correspond to hematoma. He has been on antibiotics since last week and feels that he is improving. He would like this area checked. He is feeling well and in his usual state of health today. He has no pain, no current illnesses, no other skin concerns. His allergies medications medical and social history are reviewed by me today. I have reviewed the history, physical exam, assessment and plan by Arturo Monson NP and agree. He is awake alert and he appears well. I examined his anterior lower extremities. He does have edema on the left side, not present on the right. He has a 3 cm area of minimal erythema, mild swelling and no warmth on the left anterior shin which indicates corresponds to the location of the abrasion. We discussed the report from his ultrasound, reassuring that there is no evidence of DVT, but he does need to continue to follow this area make sure that the edema resolves. If he develops any worsening or pain, he understands that he should contact his primary physician. He was advised to continue the course of antibiotics as prescribed by the emergency room. No other therapy was advised at the present time. He understands and will follow up as needed.

## 2017-09-05 NOTE — PROGRESS NOTES
Name: Omar Barreto       Age: 69 y.o. Date: 9/5/2017    Chief Complaint:   Chief Complaint   Patient presents with    Skin Exam     left lateral shin       Subjective:    HPI:  Mr.. Omar Barreto is a 69 y.o. male who presents for the evaluation of a lesion on the left lower leg. He states that the lesion appeared 3 weeks ago. The patient has had prior treatment for this lesion. Associated symptoms include persistent swollen area and now with distal leg and foot swelling. The patient reports difficulty exercising due to pain. He was seen at the ER on Friday and duplex was negative for DVT but a fluid collection of a suspected hematoma of 3 cm was noted. He was given antibiotics and told to follow up. He reports some improvement with antibiotics. Additionally, he needs and new prescription for [facility]Fu [facility]. ROS: Consitutional: Negative  Dermatological : positive for - skin lesion changes and redness  MS: leg swelling    Social History     Social History    Marital status:      Spouse name: N/A    Number of children: N/A    Years of education: N/A     Occupational History    Not on file.      Social History Main Topics    Smoking status: Former Smoker     Quit date: 6/7/1972    Smokeless tobacco: Never Used    Alcohol use No    Drug use: Not on file    Sexual activity: Not on file     Other Topics Concern    Not on file     Social History Narrative       Family History   Problem Relation Age of Onset    Cancer Sister      bladder    Cancer Brother      colon cancer    Cancer Other      breast cancer       Past Medical History:   Diagnosis Date    Hypertension     Skin cancer     BCC face, neck       Past Surgical History:   Procedure Laterality Date    HX MOHS PROCEDURES  04/10/2017    BCC right postauricular by Dr. Gonsalez Stafford Bilateral     hip replacement    HX ORTHOPAEDIC Left     rotator cuff repair       Current Outpatient Prescriptions   Medication Sig Dispense Refill    fluorouracil (EFUDEX) 5 % chemo cream Apply  to affected area two (2) times a day. 40 g 0    vit U-zjpuubs-ixxi-rutin-hb196 (BIOFLEX) 641-16-28-98 mg tab Take  by mouth daily.  cephALEXin (KEFLEX) 500 mg capsule Take 1 Cap by mouth four (4) times daily for 7 days. Indications: Skin and Skin Structure Infection 28 Cap 0    CLONIDINE HCL PO Take  by mouth.  multivitamin (ONE A DAY) tablet Take 1 Tab by mouth daily.  amLODIPine-benazepril (LOTREL) 10-20 mg per capsule Take 1 Cap by mouth daily.  FLUoxetine (PROZAC) 10 mg capsule Take  by mouth daily.  pravastatin (PRAVACHOL) 10 mg tablet Take  by mouth nightly. No Known Allergies      Objective:    Visit Vitals    /70 (BP 1 Location: Left arm, BP Patient Position: Sitting)    Pulse (!) 57    Temp 98.2 °F (36.8 °C) (Oral)    Resp 18    Ht 5' 9\" (1.753 m)    Wt 94.9 kg (209 lb 3.5 oz)    SpO2 96%    BMI 30.9 kg/m2       Breezy Patel is a 69 y.o. male who appears well and in no distress. He is awake, alert, and oriented. A limited skin examination was completed including the left lower extremity and right posterior shoulder. There is a 3 cm area of swelling and minimal redness of the left anterior shin. There is edema of the left lower extremity. There is redness and scale on the right posterior shoulder, well healed from biopsy. Venous duplex test results reviewed by me showing a likely evolving hematoma. No DVT. Assessment/Plan:    1. Hematoma left lower leg and edema. Finish antibiotics, increase activity (walking) to assist with swelling, some leg elevation and heat to hematoma area. Reassurance provided. 2. 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 He has used up the Carac he had. Due to back order, I will give generic 5-Fu to be used twice daily to finish out the 4 week duration.  We will recheck at next skin exam.

## 2017-10-02 ENCOUNTER — OFFICE VISIT (OUTPATIENT)
Dept: DERMATOLOGY | Facility: AMBULATORY SURGERY CENTER | Age: 77
End: 2017-10-02

## 2017-10-02 VITALS
WEIGHT: 200 LBS | HEIGHT: 69 IN | OXYGEN SATURATION: 98 % | TEMPERATURE: 97.6 F | BODY MASS INDEX: 29.62 KG/M2 | HEART RATE: 47 BPM | SYSTOLIC BLOOD PRESSURE: 130 MMHG | RESPIRATION RATE: 18 BRPM | DIASTOLIC BLOOD PRESSURE: 80 MMHG

## 2017-10-02 DIAGNOSIS — D04.61: ICD-10-CM

## 2017-10-02 DIAGNOSIS — Z08 FOLLOW-UP SURVEILLANCE OF SKIN CANCER, ENCOUNTER FOR: ICD-10-CM

## 2017-10-02 DIAGNOSIS — L90.5 SCAR CONDITION AND FIBROSIS OF SKIN: ICD-10-CM

## 2017-10-02 DIAGNOSIS — L82.1 SEBORRHEIC KERATOSES: ICD-10-CM

## 2017-10-02 DIAGNOSIS — Z85.828 HISTORY OF NONMELANOMA SKIN CANCER: ICD-10-CM

## 2017-10-02 DIAGNOSIS — Z85.828 FOLLOW-UP SURVEILLANCE OF SKIN CANCER, ENCOUNTER FOR: ICD-10-CM

## 2017-10-02 DIAGNOSIS — L57.0 ACTINIC KERATOSIS: Primary | ICD-10-CM

## 2017-10-02 DIAGNOSIS — L81.4 LENTIGINES: ICD-10-CM

## 2017-10-02 RX ORDER — TRIAMCINOLONE ACETONIDE 1 MG/G
OINTMENT TOPICAL
COMMUNITY
Start: 2017-08-24 | End: 2018-05-10 | Stop reason: ALTCHOICE

## 2017-10-02 RX ORDER — DIAZEPAM 5 MG/1
TABLET ORAL
COMMUNITY
Start: 2017-09-18 | End: 2018-05-10 | Stop reason: ALTCHOICE

## 2017-10-02 RX ORDER — PRAVASTATIN SODIUM 40 MG/1
TABLET ORAL
COMMUNITY
Start: 2017-07-12 | End: 2020-04-08 | Stop reason: SDUPTHER

## 2017-10-02 RX ORDER — TIZANIDINE 4 MG/1
TABLET ORAL
COMMUNITY
Start: 2017-08-03 | End: 2018-05-10 | Stop reason: ALTCHOICE

## 2017-10-02 NOTE — MR AVS SNAPSHOT
Visit Information Date & Time Provider Department Dept. Phone Encounter #  
 10/2/2017 10:30 AM Ebonie Rodriguez NP [facility] [phone] [id number] Upcoming Health Maintenance Date Due DTaP/Tdap/Td series (1 - Tdap) 3/6/1962 ZOSTER VACCINE AGE 60> 1/6/2001 GLAUCOMA SCREENING Q2Y 3/6/2006 Pneumococcal 65+ Low/Medium Risk (1 of 2 - PCV13) 3/6/2006 MEDICARE YEARLY EXAM 3/6/2006 Allergies as of 10/2/2017  Review Complete On: 10/2/2017 By: Tierra Marroquin No Known Allergies Current Immunizations  Never Reviewed No immunizations on file. Not reviewed this visit Vitals BP Pulse Temp Resp Height(growth percentile) Weight(growth percentile) 130/80 (BP 1 Location: Left arm, BP Patient Position: Sitting) (!) 47 97.6 °F (36.4 °C) (Oral) 18 5' 9\" (1.753 m) 200 lb (90.7 kg) SpO2 BMI Smoking Status 98% 29.53 kg/m2 Former Smoker BMI and BSA Data Body Mass Index Body Surface Area  
 29.53 kg/m 2 2.1 m 2 Preferred Pharmacy Pharmacy Name Phone [facility] [phone] [phone] Your Updated Medication List  
  
   
This list is accurate as of: 10/2/17 10:45 AM.  Always use your most recent med list.  
  
  
  
  
 Brenda Navarrete 993-23-14-63 mg Tab Generic drug:  vit M-bypsqcu-zwub-rutin-hb196 Take  by mouth daily. CLONIDINE HCL PO Take  by mouth. diazePAM 5 mg tablet Commonly known as:  VALIUM  
  
 fluorouracil 5 % chemo cream  
Commonly known as:  EFUDEX Apply  to affected area two (2) times a day. LOTREL 10-20 mg per capsule Generic drug:  amLODIPine-benazepril Take 1 Cap by mouth daily. multivitamin tablet Commonly known as:  ONE A DAY Take 1 Tab by mouth daily. * pravastatin 40 mg tablet Commonly known as:  PRAVACHOL * pravastatin 10 mg tablet Commonly known as:  PRAVACHOL Take  by mouth nightly. PROzac 10 mg capsule Generic drug:  FLUoxetine Take  by mouth daily. tiZANidine 4 mg tablet Commonly known as:  ZANAFLEX  
  
 triamcinolone acetonide 0.1 % ointment Commonly known as:  KENALOG * Notice: This list has 2 medication(s) that are the same as other medications prescribed for you. Read the directions carefully, and ask your doctor or other care provider to review them with you. Patient Instructions Self Skin Exam and Sunscreens Early detection and treatment is essential in the treatment of all forms of skin cancer. If caught early, all forms of skin cancer are curable. In addition to your regular visits, you should perform a monthly skin examination. Over time, you become familiar with what is normally found on your skin and can identify new or suspicious spots. One of the screening tools you can use to assess your skin is to follow the ABCDEs: 
 
A= Asymmetry (One half is unlike the other half) B= Border (An irregular, scalloped or poorly defined edge) C= Color (Is varied from one area to another, has shades of tan, brown/ black,       white, red or blue) D= Diameter (Spots larger than 6mm or a pencil eraser) E= Evolving (New spots or one that is changing in size, shape, or color) A follow- up interval will be customized based on your history of skin cancer or level of skin damage and risk factors. In any case, if you notice a suspicious or new spot, an appointment should be arranged between regular visits. Everyone should use sunscreen and sun-safe practices, which is especially important for those with a personal or family history of skin cancer. Suggestions for this include: 1. Use daily moisturizers containing SPF 30 or higher. 2. Wear long sleeve clothing with UPF ratings and a broad-brimmed hat. 3. Apply sunscreen with SPF 30 or higher to all sun exposed areas if you are going to be in the sun. A broad spectrum UVA/ UVB sunscreen is best.  Dont forget to REAPPLY every two hours or more often if swimming or sweating! 4. Avoid outside activities during peak sun hours, especially in the summer (10am- 2pm). 5. DO NOT use tanning beds. Using sunscreen and sun-safe practices can help reduce the likelihood of developing skin cancer or additional skin cancers in those previously diagnosed. Introducing [facility]! Dear Danna Feldman: 
Thank you for requesting a [employer] account. Our records indicate that you have previously registered for a [employer] account but its currently inactive. Please call our [employer] support line at [phone]. Additional Information If you have questions, please visit the Frequently Asked Questions section of the [employer] website at [email or web address] Are You a [email or web address]/. Remember, [employer] is NOT to be used for urgent needs. For medical emergencies, dial 911. Now available from your iPhone and Android! Please provide this summary of care documentation to your next provider. Your primary care clinician is listed as Genesis Brizuela. If you have any questions after today's visit, please call [phone].

## 2017-10-02 NOTE — PROGRESS NOTES
Name: Fritz Dougherty       Age: 69 y.o. Date: 10/2/2017    Chief Complaint:   Chief Complaint   Patient presents with    Skin Exam       Subjective:    HPI  Mr. Fritz Dougherty is a 69 y.o. male who presents for a skin exam.  The patient's last visit was one month ago for a lesion on the right shoulder. This was biopsied and showed SCCi. He is using 5-Fu successfully at this time. The patient does have current complaints related to his skin. He notes a new pink area on the mid chest for a few weeks without symptoms. The patient's pertinent skin history includes : multiple NMSC    ROS: Constitutional: Negative. Dermatological : positive for - skin lesion changes      Social History     Social History    Marital status:      Spouse name: N/A    Number of children: N/A    Years of education: N/A     Occupational History    Not on file.      Social History Main Topics    Smoking status: Former Smoker     Quit date: 6/7/1972    Smokeless tobacco: Never Used    Alcohol use No    Drug use: Not on file    Sexual activity: Not on file     Other Topics Concern    Not on file     Social History Narrative       Family History   Problem Relation Age of Onset    Cancer Sister      bladder    Cancer Brother      colon cancer    Cancer Other      breast cancer       Past Medical History:   Diagnosis Date    Hypertension     Skin cancer     BCC face, neck       Past Surgical History:   Procedure Laterality Date    HX MOHS PROCEDURES  04/10/2017    BCC right postauricular by Dr. Arya Perla Bilateral     hip replacement    HX ORTHOPAEDIC Left     rotator cuff repair       Current Outpatient Prescriptions   Medication Sig Dispense Refill    diazePAM (VALIUM) 5 mg tablet       pravastatin (PRAVACHOL) 40 mg tablet       tiZANidine (ZANAFLEX) 4 mg tablet       triamcinolone acetonide (KENALOG) 0.1 % ointment       fluorouracil (EFUDEX) 5 % chemo cream Apply  to affected area two (2) times a day. 40 g 0    vit T-vvpzssi-zgoz-rutin-hb196 (BIOFLEX) 930-28-04-58 mg tab Take  by mouth daily.  CLONIDINE HCL PO Take  by mouth.  multivitamin (ONE A DAY) tablet Take 1 Tab by mouth daily.  amLODIPine-benazepril (LOTREL) 10-20 mg per capsule Take 1 Cap by mouth daily.  FLUoxetine (PROZAC) 10 mg capsule Take  by mouth daily.  pravastatin (PRAVACHOL) 10 mg tablet Take  by mouth nightly. No Known Allergies      Objective:    Visit Vitals    /80 (BP 1 Location: Left arm, BP Patient Position: Sitting)    Pulse (!) 47    Temp 97.6 °F (36.4 °C) (Oral)    Resp 18    Ht 5' 9\" (1.753 m)    Wt 90.7 kg (200 lb)    SpO2 98%    BMI 29.53 kg/m2       Mazin Lara is a 69 y.o. male who appears well and in no distress. He is awake, alert, and oriented. There is no preauricular, submandibular, or cervical lymphadenopathy. A skin examination was performed including his scalp, face (including eyelids), ears, neck, chest, back, abdomen, upper extremities (including digits/nails), lower extremities (knees to ankles), breasts. There are lentigines on sun exposed areas. He has multiple well-healed scars on the face, post regular skin, back without evidence of lesion recurrence. There is scaling and inflammation on the right posterior shoulder consistent with his use of 5-fluorouracil. There is thin scaled actinic keratoses on each forearm. This is more predominant currently on the right. He has sun damage noted on each preauricular area. He is numerous scattered seborrheic keratoses, noninflamed. There is a pink papule that is keratotic on the left mid chest most consistent with a inflamed seborrheic keratosis. On the left forearm there is a 3 mm shiny pink papule that is firm, most consistent with inflammatory lesion or small cyst.    Assessment/Plan: 1. Actinic Keratoses. The diagnosis of this precancerous lesion related to sun exposure was reviewed.   Verbal consent was obtained. I treated 4 lesions with cryotherapy and post-cryotherapy care was reviewed. [facility]   OFFICE PROCEDURE PROGRESS NOTE   Chart reviewed for the following:   I, Raul Madrigal, have reviewed the History, Physical and updated the Allergic reactions for Lorn Staff. TIME OUT performed immediately prior to start of procedure:   IGriselda, have performed the following reviews on Lorn Staff   prior to the start of the procedure:     * Patient was identified by name and date of birth   * Agreement on procedure being performed was verified   * Risks and Benefits explained to the patient   * Procedure site verified and marked as necessary   * Patient was positioned for comfort   * Verbal consent was obtained    Time: 1100  Date of procedure: 10/2/2017  Procedure performed by: Lala Diggs. Lennard Carrel, DNP  Provider assisted by: none   Patient assisted by: self   How tolerated by patient: tolerated the procedure well with no complications   Comments: none    2. Personal history of skin cancer. I discussed sun protection, sunscreen use, the warning signs of skin cancer, the need for self-skin examinations, and the need for regular practitioner exams every 6 months. The patient should follow up sooner as needed if new, changing, or symptomatic skin lesions arise. 3. Solar lentigos. The diagnosis and relationship to sun exposure was reviewed. Sun protection advised. 4. 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 Few days left of 5-Fu, reacting appropriate. 5.Seborrheic keratoses. The diagnosis was reviewed and the patient was reassured that no treatment is needed for these benign lesions.

## 2017-10-02 NOTE — PROGRESS NOTES
Chief Complaint   Patient presents with    Skin Exam     1. Have you been to the ER, urgent care clinic since your last visit? Hospitalized since your last visit? No    2. Have you seen or consulted any other health care providers outside of the [address] [place] [address] since your last visit? Include any pap smears or colon screening. Yes, Dr. Rody Bright Urologist on Sept. 14.      Pt states he received his Flu Vaccine during his yearly with his PCP/ Dr. Sarwat Boo.

## 2018-05-10 ENCOUNTER — HOSPITAL ENCOUNTER (OUTPATIENT)
Dept: LAB | Age: 78
Discharge: HOME | End: 2018-05-10

## 2018-05-10 ENCOUNTER — OFFICE VISIT (OUTPATIENT)
Dept: DERMATOLOGY | Facility: AMBULATORY SURGERY CENTER | Age: 78
End: 2018-05-10

## 2018-05-10 VITALS
WEIGHT: 200 LBS | OXYGEN SATURATION: 97 % | DIASTOLIC BLOOD PRESSURE: 74 MMHG | BODY MASS INDEX: 29.62 KG/M2 | SYSTOLIC BLOOD PRESSURE: 130 MMHG | HEIGHT: 69 IN | HEART RATE: 49 BPM | RESPIRATION RATE: 16 BRPM

## 2018-05-10 DIAGNOSIS — L82.1 OTHER SEBORRHEIC KERATOSIS: ICD-10-CM

## 2018-05-10 DIAGNOSIS — D23.9 DERMATOFIBROMA: ICD-10-CM

## 2018-05-10 DIAGNOSIS — L81.4 LENTIGINES: ICD-10-CM

## 2018-05-10 DIAGNOSIS — Z85.828 HISTORY OF NONMELANOMA SKIN CANCER: ICD-10-CM

## 2018-05-10 DIAGNOSIS — L29.9 PRURITUS: ICD-10-CM

## 2018-05-10 DIAGNOSIS — D48.5 NEOPLASM OF UNCERTAIN BEHAVIOR OF SKIN OF FOREHEAD: Primary | ICD-10-CM

## 2018-05-10 NOTE — MR AVS SNAPSHOT
[address] [facility] [address] 
[phone] Patient: Charline Porras MRN: [id number] GLE:[birth year] Visit Information Date & Time Provider Department Dept. Phone Encounter #  
 5/10/2018  3:00 PM ANALILIA Dejesus [phone] [id number] Upcoming Health Maintenance Date Due DTaP/Tdap/Td series (1 - Tdap) 3/6/1962 ZOSTER VACCINE AGE 60> 1/6/2001 GLAUCOMA SCREENING Q2Y 3/6/2006 Pneumococcal 65+ Low/Medium Risk (1 of 2 - PCV13) 3/6/2006 MEDICARE YEARLY EXAM 3/14/2018 Influenza Age 5 to Adult 8/1/2018 Allergies as of 5/10/2018  Review Complete On: 5/10/2018 By: Cira Brunner, LPN No Known Allergies Current Immunizations  Never Reviewed No immunizations on file. Not reviewed this visit Vitals BP Pulse Resp Height(growth percentile) Weight(growth percentile) SpO2  
 130/74 (BP 1 Location: Left arm, BP Patient Position: Sitting) (!) 49 16 5' 9\" (1.753 m) 200 lb (90.7 kg) 97% BMI Smoking Status 29.53 kg/m2 Former Smoker BMI and BSA Data Body Mass Index Body Surface Area  
 29.53 kg/m 2 2.1 m 2 Preferred Pharmacy Pharmacy Name [facility], [address] [phone] Your Updated Medication List  
  
   
This list is accurate as of 5/10/18  3:17 PM.  Always use your most recent med list.  
  
  
  
  
 Mil Limon 458-96-82-57 mg Tab Generic drug:  vit E-yoarpip-ztzk-rutin-hb196 Take  by mouth daily. CLONIDINE HCL PO Take  by mouth. fluorouracil 5 % chemo cream  
Commonly known as:  EFUDEX Apply  to affected area two (2) times a day. LOTREL 10-20 mg per capsule Generic drug:  amLODIPine-benazepril Take 1 Cap by mouth daily. multivitamin tablet Commonly known as:  ONE A DAY  
 Take 1 Tab by mouth daily. * pravastatin 40 mg tablet Commonly known as:  PRAVACHOL * pravastatin 10 mg tablet Commonly known as:  PRAVACHOL Take  by mouth nightly. PROzac 10 mg capsule Generic drug:  FLUoxetine Take  by mouth daily. * Notice: This list has 2 medication(s) that are the same as other medications prescribed for you. Read the directions carefully, and ask your doctor or other care provider to review them with you. Patient Instructions Self Skin Exam and Sunscreens Early detection and treatment is essential in the treatment of all forms of skin cancer. If caught early, all forms of skin cancer are curable. In addition to your regular visits, you should perform a monthly skin examination. Over time, you become familiar with what is normally found on your skin and can identify new or suspicious spots. One of the screening tools you can use to assess your skin is to follow the ABCDEs: 
 
A= Asymmetry (One half is unlike the other half) B= Border (An irregular, scalloped or poorly defined edge) C= Color (Is varied from one area to another, has shades of tan, brown/ black,       white, red or blue) D= Diameter (Spots larger than 6mm or a pencil eraser) E= Evolving (New spots or one that is changing in size, shape, or color) A follow- up interval will be customized based on your history of skin cancer or level of skin damage and risk factors. In any case, if you notice a suspicious or new spot, an appointment should be arranged between regular visits. Everyone should use sunscreen and sun-safe practices, which is especially important for those with a personal or family history of skin cancer. Suggestions for this include: 1. Use daily moisturizers containing SPF 30 or higher. 2. Wear long sleeve clothing with UPF ratings and a broad-brimmed hat.  
3. Apply sunscreen with SPF 30 or higher to all sun exposed areas if you are going to be in the sun. A broad spectrum UVA/ UVB sunscreen is best.  Dont forget to REAPPLY every two hours or more often if swimming or sweating! 4. Avoid outside activities during peak sun hours, especially in the summer (10am- 2pm). 5. DO NOT use tanning beds. Using sunscreen and sun-safe practices can help reduce the likelihood of developing skin cancer or additional skin cancers in those previously diagnosed. Introducing [facility]! Eric Hunter introduces [employer] patient portal. Now you can access parts of your medical record, email your doctor's office, and request medication refills online. 1. In your internet browser, go to [email or web address] 2. Click on the First Time User? Click Here link in the Sign In box. You will see the New Member Sign Up page. 3. Enter your [employer] Access Code exactly as it appears below. You will not need to use this code after youve completed the sign-up process. If you do not sign up before the expiration date, you must request a new code. · [employer] Access Code: [facility] Expires: 8/8/2018  3:16 PM 
 
4. Enter the last four digits of your Social Security Number (xxxx) and Date of Birth (mm/dd/yyyy) as indicated and click Submit. You will be taken to the next sign-up page. 5. Create a [employer] ID. This will be your [employer] login ID and cannot be changed, so think of one that is secure and easy to remember. 6. Create a [employer] password. You can change your password at any time. 7. Enter your Password Reset Question and Answer. This can be used at a later time if you forget your password. 8. Enter your e-mail address. You will receive e-mail notification when new information is available in [address] 9. Click Sign Up. You can now view and download portions of your medical record. 10. Click the Download Summary menu link to download a portable copy of your medical information. If you have questions, please visit the Frequently Asked Questions section of the [facility] website. Remember, [email or web address] is NOT to be used for urgent needs. For medical emergencies, dial 911. Now available from your iPhone and Android! Please provide this summary of care documentation to your next provider. Your primary care clinician is listed as Yasmin Troncoso. If you have any questions after today's visit, please call [phone].

## 2018-05-10 NOTE — PROGRESS NOTES
Written by Pepe Driscoll, as dictated by Darren Johnson, [address]. Name: Klaudia Gay       Age: 69 y.o. Date: 5/10/2018    Chief Complaint:   Chief Complaint   Patient presents with    Skin Exam       Subjective:    HPI  Mr. Klaudia Gay is a 69 y.o. male who presents for a full skin exam.  The patient's last skin exam was on 10/2/17 and the patient does have current complaints related to his skin. He states his skin is dry and itchy. The patient's pertinent skin history includes : Multiple NMSCs    ROS: Constitutional: Negative. Dermatological : positive for - skin lesion changes      Social History     Social History    Marital status:      Spouse name: N/A    Number of children: N/A    Years of education: N/A     Occupational History    Not on file. Social History Main Topics    Smoking status: Former Smoker     Quit date: 6/7/1972    Smokeless tobacco: Never Used    Alcohol use No    Drug use: Not on file    Sexual activity: Not on file     Other Topics Concern    Not on file     Social History Narrative       Family History   Problem Relation Age of Onset    Cancer Sister      bladder    Cancer Brother      colon cancer    Cancer Other      breast cancer       Past Medical History:   Diagnosis Date    Hypertension     Skin cancer     BCC face, neck       Past Surgical History:   Procedure Laterality Date    HX MOHS PROCEDURES  04/10/2017    BCC right postauricular by Dr. Clemens Minor HX ORTHOPAEDIC Bilateral     hip replacement    HX ORTHOPAEDIC Left     rotator cuff repair       Current Outpatient Prescriptions   Medication Sig Dispense Refill    pravastatin (PRAVACHOL) 40 mg tablet       fluorouracil (EFUDEX) 5 % chemo cream Apply  to affected area two (2) times a day. 40 g 0    vit K-ddudnfp-oara-rutin-hb196 (BIOFLEX) 175-72-82-99 mg tab Take  by mouth daily.  CLONIDINE HCL PO Take  by mouth.       multivitamin (ONE A DAY) tablet Take 1 Tab by mouth daily.  amLODIPine-benazepril (LOTREL) 10-20 mg per capsule Take 1 Cap by mouth daily.  FLUoxetine (PROZAC) 10 mg capsule Take  by mouth daily.  pravastatin (PRAVACHOL) 10 mg tablet Take  by mouth nightly. No Known Allergies      Objective:    Visit Vitals    /74 (BP 1 Location: Left arm, BP Patient Position: Sitting)    Pulse (!) 49    Resp 16    Ht 5' 9\" (1.753 m)    Wt 200 lb (90.7 kg)    SpO2 97%    BMI 29.53 kg/m2       Mary Joshua is a 69 y.o. male who appears well and in no distress. He is awake, alert, and oriented. There is no preauricular, submandibular, or cervical lymphadenopathy. A skin examination was performed including his scalp, face (including eyelids), ears, neck, chest, back, abdomen, upper extremities (including digits/nails), lower extremities (mid thigh to ankles), breasts. He has well healed surgical sites without evidence of lesion recurrences. There are lentigines on sun exposed areas. There are scattered waxy macules and keratotic papules consistent with seborrheic keratoses - some which are inflamed. He has a pink macule with a dot of hemorrhagic crusting that is indistinct on his right forehead concerning for basal cell carcinoma. He has dermatofibromas on his lower legs. No specific rash or lesion in the area of itching on the left lower back. Assessment/Plan:    1. Personal history of nonmelanoma skin cancers. I discussed sun protection, sunscreen use, the warning signs of skin cancer, the need for self-skin examinations, and the need for regular practitioner exams every 6 months. The patient should follow up sooner as needed if new, changing, or symptomatic skin lesions arise. 2. Solar lentigos. The diagnosis and relationship to sun exposure was reviewed. Sun protection advised. 3. Seborrheic keratoses.   The diagnosis was reviewed and the patient was reassured that no treatment is needed for these benign lesions. 4. Neoplasm of Uncertain Behavior, right forehead, favor BCC. The differential diagnoses were discussed. A shave biopsy was advised to sample this lesion. The procedure was reviewed and verbal and written consent were obtained. The risks of pain, bleeding, infection, and scar were discussed. The patient is aware that this is a sample and is intended for diagnosis and not therapy of the skin lesion. I performed the procedure. The site was cleansed and anesthetized with 1% Lidocaine with Epinephrine 1:100,000. A shave biopsy was performed to sample the lesion. Drysol was used for hemostasis. The wound was bandaged and care reviewed. The specimen was sent to pathology. I will contact the patient with the results and any further treatment that may be necessary. We discussed the option of 5-FU if pathology shows superficial basal cell carcinoma. 5. Dermatofibromas, lower legs. The diagnosis was reviewed and the patient was reassured that no treatment is needed for these benign conditions at this time. The patient should follow up should the lesion change or become symptomatic. 6. Pruritus. Suggest emollients. This plan was reviewed with the patient and patient agrees. All questions were answered. This scribe documentation was reviewed by me and accurately reflects the examination and decisions made by me. [facility]   OFFICE PROCEDURE PROGRESS NOTE   Chart reviewed for the following:   IAquilino, have reviewed the History, Physical and updated the Allergic reactions for Zelphia Favorite. TIME OUT performed immediately prior to start of procedure:   IGriselda, have performed the following reviews on Zelphia Favorite   prior to the start of the procedure:     * Patient was identified by name and date of birth   * Agreement on procedure being performed was verified   * Risks and Benefits explained to the patient   * Procedure site verified and marked as necessary   * Patient was positioned for comfort   * Consent was signed and verified     Time: 3:30 PM   Date of procedure: 5/10/2018  Procedure performed by: Dex Gallardo. Rohini Session  Provider assisted by:  Robin Wolf LPN    Patient assisted by: self   How tolerated by patient: tolerated the procedure well with no complications   Comments: none

## 2018-05-15 NOTE — PROGRESS NOTES
I spoke with the patient and he is aware of the diagnosis of superficial BCC. He will use 5-fu twice per day for 4 weeks as discussed. He has the medication from prior use.

## 2018-07-20 NOTE — ED TRIAGE NOTES
Pt rpts two weeks ago fell and scraped his LLE.   Yesterday began with redness and swelling.  + pain Speaking Coherently

## 2018-08-29 ENCOUNTER — TELEPHONE (OUTPATIENT)
Dept: DERMATOLOGY | Facility: AMBULATORY SURGERY CENTER | Age: 78
End: 2018-08-29

## 2019-02-22 ENCOUNTER — OFFICE VISIT (OUTPATIENT)
Dept: DERMATOLOGY | Facility: AMBULATORY SURGERY CENTER | Age: 79
End: 2019-02-22

## 2019-02-22 VITALS
DIASTOLIC BLOOD PRESSURE: 76 MMHG | HEIGHT: 69 IN | WEIGHT: 200 LBS | HEART RATE: 78 BPM | OXYGEN SATURATION: 98 % | BODY MASS INDEX: 29.62 KG/M2 | SYSTOLIC BLOOD PRESSURE: 128 MMHG | TEMPERATURE: 98 F

## 2019-02-22 DIAGNOSIS — L30.0 NUMMULAR ECZEMA: Primary | ICD-10-CM

## 2019-02-22 RX ORDER — FLUOCINONIDE 0.5 MG/G
OINTMENT TOPICAL 2 TIMES DAILY
Qty: 120 G | Refills: 1 | Status: SHIPPED | OUTPATIENT
Start: 2019-02-22 | End: 2021-10-23

## 2019-02-22 NOTE — PROGRESS NOTES
Written by Reynold Marks, as dictated by Raul Jacome, [address]. Name: Beau Pritchett       Age: 69 y.o. Date: 2/22/2019    Chief Complaint:   Chief Complaint   Patient presents with    Skin Exam     area on both legs       Subjective:    HPI:  Mr.. Beau Pritchett is a 69 y.o. male who presents for the evaluation of a rash on the bilateral lower legs. He states that the lesion appeared 1 week ago. The patient has not had prior treatment for this lesion. Associated symptoms include nonhealing, red, growing, and spreading lesion. The lesions were weeping and oozing when they first presented. He has used Bactine and calamine lotion on the lesions with mild relief, and it did help with the itching. The rash is not present on any other part of the body.      Pt's hx:  -BCC, right forehead, 5-FU, 05/10/18  -SCCi, right posterior shoulder, 5-FU, 09/05/17  -BCC, right postauricular, Mohs, 04/10/17  -BCC, mid back central, curettage, 03/28/17  -BCC, mid back right, curettage, 03/28/17  -BCC, right cheek, excision, 09/28/16  -BCC, right upper arm, curettage, 03/28/17  -BCC, right chest, curettage, 09/28/16  -BCC, right upper forehead, Mohs, 05/09/16  -SCCi, left forearm, curettage, 02/19/16  -BCC, right forearm, curettage, 02/19/16  -BCC, right lateral eyebrow, Mohs, 02/11/15  -BCC, left postauricular area, Mohs, 07/28/14  -BCC, mid lower back, curettage, 12/10/12  -BCC, right chin, Mohs, 03/04/10  -BCC, right postauricular neck, Mohs 03/04/10  -BCC, right clavicle, curettage, 01/12/10  -[place]os[place], left chin, Mohs, 05/30/08  -BCC, left chest, curettage, 05/06/08  -BCCs, upper trunk, prior to pt's first visit     ROS: Consitutional: Negative  Dermatological : positive for - skin lesion changes    Social History     Socioeconomic History    Marital status:      Spouse name: Not on file    Number of children: Not on file    Years of education: Not on file    Highest education level: Not on file   Social Needs    Financial resource strain: Not on file    Food insecurity - worry: Not on file    Food insecurity - inability: Not on file   [employer] needs - medical: Not on file   [employer] needs - non-medical: Not on file   Occupational History    Not on file   Tobacco Use    Smoking status: Former Smoker     Last attempt to quit: 1972     Years since quittin.7    Smokeless tobacco: Never Used   Substance and Sexual Activity    Alcohol use: No    Drug use: Not on file    Sexual activity: Not on file   Other Topics Concern    Not on file   Social History Narrative    Not on file       Family History   Problem Relation Age of Onset    Cancer Sister         bladder    Cancer Brother         colon cancer    Cancer Other         breast cancer       Past Medical History:   Diagnosis Date    Hypertension     Skin cancer     BCC face, neck       Past Surgical History:   Procedure Laterality Date    HX MOHS PROCEDURES  04/10/2017    BCC right postauricular by Dr. Nilson Smallwood Bilateral     hip replacement    HX ORTHOPAEDIC Left     rotator cuff repair       Current Outpatient Medications   Medication Sig Dispense Refill    vit A-kxakfuk-pboj-rutin-hb196 (BIOFLEX) [id number] mg tab Take  by mouth daily.  CLONIDINE HCL PO Take  by mouth.  multivitamin (ONE A DAY) tablet Take 1 Tab by mouth daily.  amLODIPine-benazepril (LOTREL) 10-20 mg per capsule Take 1 Cap by mouth daily.  FLUoxetine (PROZAC) 10 mg capsule Take  by mouth daily.  pravastatin (PRAVACHOL) 10 mg tablet Take  by mouth nightly.  pravastatin (PRAVACHOL) 40 mg tablet       fluorouracil (EFUDEX) 5 % chemo cream Apply  to affected area two (2) times a day.  40 g 0       No Known Allergies      Objective:    Visit Vitals  /76 (BP 1 Location: Left arm, BP Patient Position: Sitting)   Pulse 78   Temp 98 °F (36.7 °C) (Oral)   Ht 5' 9\" (1.753 m)   Wt 200 lb (90.7 kg)   SpO2 98%   BMI 29.53 kg/m²       Maurice Elliott is a 69 y.o. male who appears well and in no distress. He is awake, alert, and oriented. A limited skin examination was completed including the lower legs. There are medium and large scaly pink patches with some excoriations on the bilateral lower legs, most consistent with nummular eczema. Assessment/Plan:    1. Neoplasms of Uncertain Behavior, lower legs, favor nummular eczema. The differential diagnoses were discussed. I prescribed Fluocinonide ointment to apply to the area BID for 10 days after soaking the area. I advised to use the steroid cream under occlusion intermittently through the weekend. I advised to use cool compresses to help with the itchy nature of the lesions. Follow up will be in 10 days. This plan was reviewed with the patient and patient agrees. All questions were answered. This scribe documentation was reviewed by me and accurately reflects the examination and decisions made by me.

## 2019-03-01 ENCOUNTER — TELEPHONE (OUTPATIENT)
Dept: DERMATOLOGY | Facility: AMBULATORY SURGERY CENTER | Age: 79
End: 2019-03-01

## 2019-03-01 NOTE — TELEPHONE ENCOUNTER
Returned pt call. He is doing much better - legs are just red in the areas now but no scaling or cracking. He will continue the steroid until next Friday and then switch to thick cream. Follow up for regular skin exam which is due in July. He will call to schedule.

## 2019-03-01 NOTE — TELEPHONE ENCOUNTER
Pt stated the cream he was giving for his rash has cleared up and everything is going well. He stated he does not need his appt. scheduled for this upcoming Monday. He can be best reached at [phone].

## 2019-06-03 ENCOUNTER — OFFICE VISIT (OUTPATIENT)
Dept: DERMATOLOGY | Facility: AMBULATORY SURGERY CENTER | Age: 79
End: 2019-06-03

## 2019-06-03 ENCOUNTER — TELEPHONE (OUTPATIENT)
Dept: DERMATOLOGY | Facility: AMBULATORY SURGERY CENTER | Age: 79
End: 2019-06-03

## 2019-06-03 VITALS
BODY MASS INDEX: 29.62 KG/M2 | SYSTOLIC BLOOD PRESSURE: 124 MMHG | TEMPERATURE: 98.4 F | DIASTOLIC BLOOD PRESSURE: 72 MMHG | HEIGHT: 69 IN | OXYGEN SATURATION: 95 % | WEIGHT: 200 LBS | HEART RATE: 63 BPM

## 2019-06-03 DIAGNOSIS — L82.1 SEBORRHEIC KERATOSES: ICD-10-CM

## 2019-06-03 DIAGNOSIS — L57.0 ACTINIC KERATOSES: Primary | ICD-10-CM

## 2019-06-03 DIAGNOSIS — L81.0 POSTINFLAMMATORY HYPERPIGMENTATION: ICD-10-CM

## 2019-06-03 NOTE — TELEPHONE ENCOUNTER
I spoke with his wife and she states he was requesting an appt due to scaly spot on the forehead. He started 5-Fu about 4 days ago and now has smaller areas as well. Given appt. For today.

## 2019-06-03 NOTE — PROGRESS NOTES
Written by Taj Merino, as dictated by Romi Smith, [address]. Name: Berna Woodward       Age: 67 y.o. Date: 6/3/2019    Chief Complaint:   Chief Complaint   Patient presents with    Skin Exam     forehead       Subjective:    HPI:  Mr.. Berna Woodward is a 67 y.o. male who presents for the evaluation of a lesion on the forehead. He states that the lesion appeared 1 month ago. The patient has had prior treatment for this lesion. Associated symptoms include growing and scabbing lesion. He states the lesion first presented 1 month ago after his granddaughter's graduation where he sat in the sun for about 2 hours. He began Efudex treatment on the spot and entire forehead about 1 weeks ago. He notes a small \"gash\" appeared at the lesion after starting. He did start spreading the Efudex more diffusely and his whole forehead has flared up as well. He also notes scaly lesions on the right ear. He reports a pink lesion on the right medial calf. He also reports a bump on the chest that he would like evaluated.      ROS: Consitutional: Negative  Dermatological : positive for - skin lesion changes    Social History     Socioeconomic History    Marital status:      Spouse name: Not on file    Number of children: Not on file    Years of education: Not on file    Highest education level: Not on file   Occupational History    Not on file   Social Needs    Financial resource strain: Not on file    Food insecurity:     Worry: Not on file     Inability: Not on file    Transportation needs:     Medical: Not on file     Non-medical: Not on file   Tobacco Use    Smoking status: Former Smoker     Last attempt to quit: 1972     Years since quittin.0    Smokeless tobacco: Never Used   Substance and Sexual Activity    Alcohol use: No    Drug use: Not on file    Sexual activity: Not on file   Lifestyle    Physical activity:     Days per week: Not on file     Minutes per session: Not on file    Stress: Not on file   Relationships    Social connections:     Talks on phone: Not on file     Gets together: Not on file     Attends [employer] witness service: Not on file     Active member of club or organization: Not on file     Attends meetings of clubs or organizations: Not on file     Relationship status: Not on file    Intimate partner violence:     Fear of current or ex partner: Not on file     Emotionally abused: Not on file     Physically abused: Not on file     Forced sexual activity: Not on file   Other Topics Concern    Not on file   Social History Narrative    Not on file       Family History   Problem Relation Age of Onset    Cancer Sister         bladder    Cancer Brother         colon cancer    Cancer Other         breast cancer       Past Medical History:   Diagnosis Date    Hypertension     Skin cancer     BCC face, neck       Past Surgical History:   Procedure Laterality Date    HX MOHS PROCEDURES  04/10/2017    [facility] right postauricular by Dr. Joe Marcial Bilateral     hip replacement    HX ORTHOPAEDIC Left     rotator cuff repair       Current Outpatient Medications   Medication Sig Dispense Refill    fluorouracil (EFUDEX) 5 % chemo cream Apply  to affected area two (2) times a day. 40 g 0    vit T-bbekgfy-uivj-rutin-hb196 (BIOFLEX) 852-74-59-74 mg tab Take  by mouth daily.  CLONIDINE HCL PO Take  by mouth.  multivitamin (ONE A DAY) tablet Take 1 Tab by mouth daily.  amLODIPine-benazepril (LOTREL) 10-20 mg per capsule Take 1 Cap by mouth daily.  FLUoxetine (PROZAC) 10 mg capsule Take  by mouth daily.  pravastatin (PRAVACHOL) 10 mg tablet Take  by mouth nightly.  fluocinoNIDE (LIDEX) 0.05 % ointment Apply  to affected area two (2) times a day.  120 g 1    pravastatin (PRAVACHOL) 40 mg tablet          No Known Allergies      Objective:    Visit Vitals  /72 (BP 1 Location: Left arm, BP Patient Position: Sitting)   Pulse 63   Temp 98.4 °F (36.9 °C) (Oral)   Ht 5' 9\" (1.753 m)   Wt 200 lb (90.7 kg)   SpO2 95%   BMI 29.53 kg/m²       Devon Monroe is a 67 y.o. male who appears well and in no distress. He is awake, alert, and oriented. There is no preauricular, submandibular, or cervical lymphadenopathy. A limited skin examination was completed including the face, right ear, right medial calf, and chest.    There are isolated thin-scaled actinic keratoses on the right ear x2, left postauricular, and left sideburn x2. There is redness on the forehead consistent with effects of Efudex treatment. No papule in the area of treatment of specific concern. He has scattered waxy macules and keratotic papules consistent with seborrheic keratoses - including his lesions of concern on the right posterior ear and right chest. There is an 8 x 7 mm pink macule on the right medial calf-- photographed for monitoring. There is a pink macule with what appears to be a pustule associated on the mid chest-- photographed for monitoring. Right medial calf       Mid chest    Assessment/Plan:    1. Actinic Keratoses. The diagnosis of this precancerous lesion related to sun exposure was reviewed. Verbal consent was obtained. I treated 5 lesions with cryotherapy and post-cryotherapy care was reviewed. 2. Inflammation, forehead. The diagnosis was reviewed and patient was reassured that side effects are consistent with Efudex treatment. He will continue treatment of the forehead, for a total of 4 weeks of treatment with breaks in between if needed. 3. Seborrheic keratoses. The diagnosis was reviewed and the patient was reassured that no treatment is needed for these benign lesions. This plan was reviewed with the patient and patient agrees. All questions were answered. This scribe documentation was reviewed by me and accurately reflects the examination and decisions made by me.     [address]   OFFICE PROCEDURE PROGRESS NOTE   Chart reviewed for the following:   Emilee CLINTON, have reviewed the History, Physical and updated the Allergic reactions for Willis Mccullough. TIME OUT performed immediately prior to start of procedure:   Griselda CLINTON, have performed the following reviews on Willis Mccullough   prior to the start of the procedure:     * Patient was identified by name and date of birth   * Agreement on procedure being performed was verified   * Risks and Benefits explained to the patient   * Procedure site verified and marked as necessary   * Patient was positioned for comfort   * Consent was signed and verified     Time: 3:25 PM  Date of procedure: 6/3/2019  Procedure performed by: Katia Blandon.  Evelyn Hargrove DNP  Provider assisted by: self   Patient assisted by: self   How tolerated by patient: tolerated the procedure well with no complications   Comments: none

## 2019-06-10 ENCOUNTER — TELEPHONE (OUTPATIENT)
Dept: DERMATOLOGY | Facility: AMBULATORY SURGERY CENTER | Age: 79
End: 2019-06-10

## 2019-06-10 NOTE — TELEPHONE ENCOUNTER
I spoke with the pt and he states there is redness and crusting on the forehead that looks bad. He wanted to know if he can use any lotion. I discussed that he should not use anything occlusive such as thick cream or vaseline. A thin lotion occasionally or vinegar soaks may help soothe the area. He could also take a break in the 4 weeks of treatment if needed.

## 2019-06-10 NOTE — TELEPHONE ENCOUNTER
Patient called asking if you could please give him a phone call back on home number. Would not state what it was in reference to.     Thanks,

## 2019-08-27 ENCOUNTER — HOSPITAL ENCOUNTER (OUTPATIENT)
Age: 79
Setting detail: OUTPATIENT SURGERY
Discharge: HOME | End: 2019-08-27
Attending: INTERNAL MEDICINE | Admitting: INTERNAL MEDICINE
Payer: MEDICARE

## 2019-08-27 ENCOUNTER — ANESTHESIA (OUTPATIENT)
Dept: ENDOSCOPY | Age: 79
End: 2019-08-27
Payer: MEDICARE

## 2019-08-27 ENCOUNTER — ANESTHESIA EVENT (OUTPATIENT)
Dept: ENDOSCOPY | Age: 79
End: 2019-08-27
Payer: MEDICARE

## 2019-08-27 VITALS
DIASTOLIC BLOOD PRESSURE: 71 MMHG | SYSTOLIC BLOOD PRESSURE: 130 MMHG | HEART RATE: 45 BPM | HEIGHT: 69 IN | RESPIRATION RATE: 15 BRPM | TEMPERATURE: 96.8 F | WEIGHT: 200 LBS | OXYGEN SATURATION: 93 % | BODY MASS INDEX: 29.62 KG/M2

## 2019-08-27 PROCEDURE — 88305 TISSUE EXAM BY PATHOLOGIST: CPT

## 2019-08-27 PROCEDURE — 77030009426 HC FCPS BIOP ENDOSC BSC -B: Performed by: INTERNAL MEDICINE

## 2019-08-27 PROCEDURE — 74011250636 HC RX REV CODE- 250/636: Performed by: NURSE ANESTHETIST, CERTIFIED REGISTERED

## 2019-08-27 PROCEDURE — 74011250637 HC RX REV CODE- 250/637: Performed by: INTERNAL MEDICINE

## 2019-08-27 PROCEDURE — 76060000031 HC ANESTHESIA FIRST 0.5 HR: Performed by: INTERNAL MEDICINE

## 2019-08-27 PROCEDURE — 76040000019: Performed by: INTERNAL MEDICINE

## 2019-08-27 RX ORDER — ATROPINE SULFATE 0.1 MG/ML
0.5 INJECTION INTRAVENOUS
Status: DISCONTINUED | OUTPATIENT
Start: 2019-08-27 | End: 2019-08-27 | Stop reason: HOSPADM

## 2019-08-27 RX ORDER — SODIUM CHLORIDE 9 MG/ML
INJECTION, SOLUTION INTRAVENOUS
Status: DISCONTINUED | OUTPATIENT
Start: 2019-08-27 | End: 2019-08-27 | Stop reason: HOSPADM

## 2019-08-27 RX ORDER — NALOXONE HYDROCHLORIDE 0.4 MG/ML
0.4 INJECTION, SOLUTION INTRAMUSCULAR; INTRAVENOUS; SUBCUTANEOUS
Status: DISCONTINUED | OUTPATIENT
Start: 2019-08-27 | End: 2019-08-27 | Stop reason: HOSPADM

## 2019-08-27 RX ORDER — LIDOCAINE HYDROCHLORIDE 20 MG/ML
INJECTION, SOLUTION EPIDURAL; INFILTRATION; INTRACAUDAL; PERINEURAL AS NEEDED
Status: DISCONTINUED | OUTPATIENT
Start: 2019-08-27 | End: 2019-08-27 | Stop reason: HOSPADM

## 2019-08-27 RX ORDER — EPINEPHRINE 0.1 MG/ML
1 INJECTION INTRACARDIAC; INTRAVENOUS
Status: DISCONTINUED | OUTPATIENT
Start: 2019-08-27 | End: 2019-08-27 | Stop reason: HOSPADM

## 2019-08-27 RX ORDER — PROPOFOL 10 MG/ML
INJECTION, EMULSION INTRAVENOUS AS NEEDED
Status: DISCONTINUED | OUTPATIENT
Start: 2019-08-27 | End: 2019-08-27 | Stop reason: HOSPADM

## 2019-08-27 RX ORDER — FLUMAZENIL 0.1 MG/ML
0.2 INJECTION INTRAVENOUS
Status: DISCONTINUED | OUTPATIENT
Start: 2019-08-27 | End: 2019-08-27 | Stop reason: HOSPADM

## 2019-08-27 RX ORDER — SODIUM CHLORIDE 0.9 % (FLUSH) 0.9 %
5-40 SYRINGE (ML) INJECTION EVERY 8 HOURS
Status: DISCONTINUED | OUTPATIENT
Start: 2019-08-27 | End: 2019-08-27 | Stop reason: HOSPADM

## 2019-08-27 RX ORDER — DEXTROMETHORPHAN/PSEUDOEPHED 2.5-7.5/.8
1.2 DROPS ORAL
Status: DISCONTINUED | OUTPATIENT
Start: 2019-08-27 | End: 2019-08-27 | Stop reason: HOSPADM

## 2019-08-27 RX ORDER — MIDAZOLAM HYDROCHLORIDE 1 MG/ML
.25-5 INJECTION, SOLUTION INTRAMUSCULAR; INTRAVENOUS
Status: DISCONTINUED | OUTPATIENT
Start: 2019-08-27 | End: 2019-08-27 | Stop reason: HOSPADM

## 2019-08-27 RX ORDER — FENTANYL CITRATE 50 UG/ML
25-200 INJECTION, SOLUTION INTRAMUSCULAR; INTRAVENOUS
Status: DISCONTINUED | OUTPATIENT
Start: 2019-08-27 | End: 2019-08-27 | Stop reason: HOSPADM

## 2019-08-27 RX ORDER — SODIUM CHLORIDE 9 MG/ML
50 INJECTION, SOLUTION INTRAVENOUS CONTINUOUS
Status: DISCONTINUED | OUTPATIENT
Start: 2019-08-27 | End: 2019-08-27 | Stop reason: HOSPADM

## 2019-08-27 RX ORDER — SODIUM CHLORIDE 0.9 % (FLUSH) 0.9 %
5-40 SYRINGE (ML) INJECTION AS NEEDED
Status: DISCONTINUED | OUTPATIENT
Start: 2019-08-27 | End: 2019-08-27 | Stop reason: HOSPADM

## 2019-08-27 RX ADMIN — PROPOFOL 30 MG: 10 INJECTION, EMULSION INTRAVENOUS at 09:29

## 2019-08-27 RX ADMIN — PROPOFOL 80 MG: 10 INJECTION, EMULSION INTRAVENOUS at 09:22

## 2019-08-27 RX ADMIN — PROPOFOL 40 MG: 10 INJECTION, EMULSION INTRAVENOUS at 09:25

## 2019-08-27 RX ADMIN — SODIUM CHLORIDE: 900 INJECTION, SOLUTION INTRAVENOUS at 09:13

## 2019-08-27 RX ADMIN — LIDOCAINE HYDROCHLORIDE 60 MG: 20 INJECTION, SOLUTION EPIDURAL; INFILTRATION; INTRACAUDAL; PERINEURAL at 09:22

## 2019-08-27 RX ADMIN — PROPOFOL 30 MG: 10 INJECTION, EMULSION INTRAVENOUS at 09:34

## 2019-08-27 RX ADMIN — PROPOFOL 30 MG: 10 INJECTION, EMULSION INTRAVENOUS at 09:27

## 2019-08-27 RX ADMIN — PROPOFOL 30 MG: 10 INJECTION, EMULSION INTRAVENOUS at 09:31

## 2019-08-27 RX ADMIN — SIMETHICONE 80 MG: 20 SUSPENSION/ DROPS ORAL at 09:29

## 2019-08-27 NOTE — H&P
[address]  174 [facility], [address] [place] [address]      History and Physical       NAME:  Mayte Avery   :   1940   MRN:   [id number]             History of Present Illness:  Patient is a 67 y.o. who is seen for screening colonoscopy. PMH:  Past Medical History:   Diagnosis Date    Hypertension     Skin cancer     BCC face, neck       PSH:  Past Surgical History:   Procedure Laterality Date    HX MOHS PROCEDURES  04/10/2017    [facility] right postauricular by Dr. Fanta Thurman Bilateral     hip replacement    HX ORTHOPAEDIC Left     rotator cuff repair       Allergies:  No Known Allergies    Home Medications:  Prior to Admission Medications   Prescriptions Last Dose Informant Patient Reported? Taking? CLONIDINE HCL PO 2019 at Unknown time  Yes Yes   Sig: Take  by mouth. FLUoxetine (PROZAC) 10 mg capsule 2019 at Unknown time  Yes Yes   Sig: Take  by mouth daily. amLODIPine-benazepril (LOTREL) 10-20 mg per capsule 2019 at Unknown time  Yes Yes   Sig: Take 1 Cap by mouth daily. fluocinoNIDE (LIDEX) 0.05 % ointment Not Taking at Unknown time  No No   Sig: Apply  to affected area two (2) times a day. multivitamin (ONE A DAY) tablet 2019 at Unknown time  Yes Yes   Sig: Take 1 Tab by mouth daily. pravastatin (PRAVACHOL) 10 mg tablet 2019 at Unknown time  Yes Yes   Sig: Take  by mouth nightly. pravastatin (PRAVACHOL) 40 mg tablet 2019 at Unknown time  Yes Yes   vit W-bwvcqtw-felm-rutin-hb196 ([address]) 023-80-75-29 mg tab 2019 at Unknown time  Yes Yes   Sig: Take  by mouth daily.       Facility-Administered Medications: None       Hospital Medications:  Current Facility-Administered Medications   Medication Dose Route Frequency    0.9% sodium chloride infusion  50 mL/hr IntraVENous CONTINUOUS    sodium chloride (NS) flush 5-40 mL  5-40 mL IntraVENous Q8H    sodium chloride (NS) flush 5-40 mL  5-40 mL IntraVENous PRN    midazolam (VERSED) injection 0.25-5 mg  0.25-5 mg IntraVENous Multiple    fentaNYL citrate (PF) injection  mcg   mcg IntraVENous Multiple    naloxone (NARCAN) injection 0.4 mg  0.4 mg IntraVENous Multiple    flumazenil (ROMAZICON) 0.1 mg/mL injection 0.2 mg  0.2 mg IntraVENous Multiple    simethicone (MYLICON) 57UD/4.6QU oral drops 80 mg  1.2 mL Oral Multiple    atropine injection 0.5 mg  0.5 mg IntraVENous ONCE PRN    EPINEPHrine (ADRENALIN) 0.1 mg/mL syringe 1 mg  1 mg Endoscopically ONCE PRN     Facility-Administered Medications Ordered in Other Encounters   Medication Dose Route Frequency    0.9% sodium chloride infusion   IntraVENous CONTINUOUS       Social History:  Social History     Tobacco Use    Smoking status: Former Smoker     Last attempt to quit: 1972     Years since quittin.2    Smokeless tobacco: Never Used   Substance Use Topics    Alcohol use: No       Family History:  Family History   Problem Relation Age of Onset    Cancer Sister         bladder    Cancer Brother         colon cancer    Cancer Other         breast cancer             Review of Systems:      Constitutional: negative fever, negative chills, negative weight loss  Eyes:   negative visual changes  ENT:   negative sore throat, tongue or lip swelling  Respiratory:  negative cough, negative dyspnea  Cards:  negative for chest pain, palpitations, lower extremity edema  GI:   See HPI  :  negative for frequency, dysuria  Integument:  negative for rash and pruritus  Heme:  negative for easy bruising and gum/nose bleeding  Musculoskel: negative for myalgias,  back pain and muscle weakness  Neuro: negative for headaches, dizziness, vertigo  Psych:  negative for feelings of anxiety, depression       Objective:     Patient Vitals for the past 8 hrs:   BP Pulse Resp SpO2 Height Weight   19 0905 142/83 (!) 57 20 100 % 5' 9\" (1.753 m) 90.7 kg (200 lb)     No intake/output data recorded.   No intake/output data recorded. EXAM:     NEURO-a&o   HEENT-wnl   LUNGS-clear    COR-regular rate and rhythym     ABD-soft , no tenderness, no rebound, good bs     EXT-no edema     Data Review     No results for input(s): WBC, HGB, HCT, PLT, HGBEXT, HCTEXT, PLTEXT in the last 72 hours. No results for input(s): NA, K, CL, CO2, BUN, CREA, GLU, PHOS, CA in the last 72 hours. No results for input(s): SGOT, GPT, AP, TBIL, TP, ALB, GLOB, GGT, AML, LPSE in the last 72 hours. No lab exists for component: AMYP, HLPSE  No results for input(s): INR, PTP, APTT in the last 72 hours.     No lab exists for component: INREXT       Assessment:   · Screening colonoscopy     Patient Active Problem List   Diagnosis Code    History  of basal cell carcinoma            Plan:   · Endoscopic procedure with sedation     Signed By: Terri Zuleta MD     8/27/2019  9:19 AM

## 2019-08-27 NOTE — PROCEDURES
[address]  174 [facility], [address] [place] [address]      Colonoscopy Operative Report    Renee Boast  [id number]  1940      Procedure Type:   Colonoscopy with polypectomy (hot biopsy)     Indications:    Family history of coloretal cancer (screening only)   Date of last colonoscopy: 5 years ago, Polyps  No    Pre-operative Diagnosis: see indication above    Post-operative Diagnosis:  See findings below    :  Danya Pedraza MD    Surgical Assistant: None    Implants:  None    Referring Provider: Iraj Lopez MD      Sedation:  MAC anesthesia Propofol      Procedure Details:  After informed consent was obtained with all risks and benefits of procedure explained and preoperative exam completed, the patient was taken to the endoscopy suite and placed in the left lateral decubitus position. Upon sequential sedation as per above, a digital rectal exam was performed demonstrating internal hemorrhoids. The Olympus videocolonoscope  was inserted in the rectum and carefully advanced to the cecum, which was identified by the ileocecal valve and appendiceal orifice. The cecum was identified by the ileocecal valve and appendiceal orifice. The quality of preparation was good. The colonoscope was slowly withdrawn with careful evaluation between folds. Retroflexion in the rectum was completed . Findings:   Rectum: normal    9 mm polyp removed by hot biopsy  Sigmoid: normal  Mild diverticulosis  Descending Colon: normal  Transverse Colon: normal  Ascending Colon: normal  Cecum: 9 mm polyp removed by hot biopsy        Specimen Removed:  as above    Complications: None. EBL:  None. Impression:    see findings    Recommendations: --Await pathology.       Recommendation for next colonscopy in 3 years  High fiber diet    Signed By: Danya Pedraza MD     8/27/2019  9:40 AM

## 2019-08-27 NOTE — ROUTINE PROCESS
Sarai Grade  1940  [id number]    Situation:  Verbal report received from: Bettie Randall RN  Procedure: Procedure(s):  COLONOSCOPY  ENDOSCOPIC POLYPECTOMY    Background:    Preoperative diagnosis: FAMILY HX OF COLON CANCER  Postoperative diagnosis: colon polyp  Divericulosis    :  Dr. Alfredo Lockhart  Assistant(s): Endoscopy Technician-1: Lurdes Vidal  Endoscopy RN-1: Loli Sharp RN    Specimens:   ID Type Source Tests Collected by Time Destination   1 : polyp Preservative Cecum  Seema Ontiveros MD 8/27/2019 0932 Pathology   2 : polyp Preservative Rectum  Seema Ontiveros MD 8/27/2019 0935 Pathology     H. Pylori  no    Assessment:  Intra-procedure medications     Anesthesia gave intra-procedure sedation and medications, see anesthesia flow sheet yes    Intravenous fluids: NS@ KVO     Vital signs stable     Abdominal assessment: round and soft     Recommendation:  Discharge patient per MD order.     Family or Friend   Permission to share finding with family or friend yes

## 2019-08-27 NOTE — PERIOP NOTES

## 2019-08-27 NOTE — DISCHARGE INSTRUCTIONS
[address]  [address] [facility], [address]    COLON DISCHARGE INSTRUCTIONS    Avila Contreras  [id number]  1940    Discomfort:  Redness at IV site- apply warm compress to area; if redness or soreness persist- contact your physician  There may be a slight amount of blood passed from the rectum  Gaseous discomfort- walking, belching will help relieve any discomfort  You may not operate a vehicle for 12 hours  You may not engage in an occupation involving machinery or appliances for rest of today  You may not drink alcoholic beverages for at least 12 hours  Avoid making any critical decisions for at least 24 hour  DIET:  You may resume your regular diet - however -  remember your colon is empty and a heavy meal will produce gas. Avoid these foods:  vegetables, fried / greasy foods, carbonated drinks     ACTIVITY:  You may  resume your normal daily activities it is recommended that you spend the remainder of the day resting -  avoid any strenuous activity. CALL M.D. ANY SIGN OF:   Increasing pain, nausea, vomiting  Abdominal distension (swelling)  New increased bleeding (oral or rectal)  Fever (chills)  Pain in chest area  Bloody discharge from nose or mouth  Shortness of breath      Follow-up Instructions:   Call Dr. Gudelia Murray for any questions or problems at [phone]   High fiber diet          ENDOSCOPY FINDINGS:   Your colonoscopy showed 2 small polyps removed and mild divertiuclosis.   Telephone # [phone]      Signed By: Gudelia Murray MD     8/27/2019  9:43 AM       DISCHARGE SUMMARY from Nurse    The following personal items collected during your admission are returned to you:   Dental Appliance: Dental Appliances: None  Vision: Visual Aid: None  Hearing Aid:    Jewelry:    Clothing:    Other Valuables:    Valuables sent to safe:

## 2019-08-27 NOTE — ANESTHESIA POSTPROCEDURE EVALUATION
Post-Anesthesia Evaluation and Assessment    Patient: Sarai Swanson MRN: [id number]  SSN: [id number]    YOB: 1940  Age: 67 y.o. Sex: male      I have evaluated the patient and they are stable and ready for discharge from the PACU. Cardiovascular Function/Vital Signs  Visit Vitals  /71   Pulse (!) 45   Temp 36 °C (96.8 °F)   Resp 15   Ht 5' 9\" (1.753 m)   Wt 90.7 kg (200 lb)   SpO2 93%   BMI 29.53 kg/m²       Patient is status post MAC anesthesia for Procedure(s):  COLONOSCOPY  ENDOSCOPIC POLYPECTOMY. Nausea/Vomiting: None    Postoperative hydration reviewed and adequate. Pain:  Pain Scale 1: Numeric (0 - 10) (08/27/19 0955)  Pain Intensity 1: 0 (08/27/19 0955)   Managed    Neurological Status: At baseline    Mental Status, Level of Consciousness: Alert and  oriented to person, place, and time    Pulmonary Status:   O2 Device: Room air (08/27/19 0955)   Adequate oxygenation and airway patent    Complications related to anesthesia: None    Post-anesthesia assessment completed. No concerns    Signed By: Efren Cannon MD     August 27, 2019              Procedure(s):  COLONOSCOPY  ENDOSCOPIC POLYPECTOMY. MAC    <BSHSIANPOST>    Vitals Value Taken Time   /71 8/27/2019  9:55 AM   Temp 36 °C (96.8 °F) 8/27/2019  9:46 AM   Pulse 55 8/27/2019  9:58 AM   Resp 23 8/27/2019  9:58 AM   SpO2 94 % 8/27/2019  9:58 AM   Vitals shown include unvalidated device data.

## 2019-08-27 NOTE — ANESTHESIA PREPROCEDURE EVALUATION
Relevant Problems   No relevant active problems       Anesthetic History   No history of anesthetic complications            Review of Systems / Medical History  Patient summary reviewed, nursing notes reviewed and pertinent labs reviewed    Pulmonary  Within defined limits                 Neuro/Psych   Within defined limits           Cardiovascular    Hypertension                   GI/Hepatic/Renal  Within defined limits              Endo/Other  Within defined limits           Other Findings              Physical Exam    Airway  Mallampati: II  TM Distance: > 6 cm  Neck ROM: normal range of motion   Mouth opening: Normal     Cardiovascular  Regular rate and rhythm,  S1 and S2 normal,  no murmur, click, rub, or gallop             Dental  No notable dental hx       Pulmonary  Breath sounds clear to auscultation               Abdominal  GI exam deferred       Other Findings            Anesthetic Plan    ASA: 2  Anesthesia type: MAC            Anesthetic plan and risks discussed with: Patient

## 2020-04-08 ENCOUNTER — HOSPITAL ENCOUNTER (OUTPATIENT)
Dept: LAB | Age: 80
Discharge: HOME | End: 2020-04-08

## 2020-04-08 ENCOUNTER — OFFICE VISIT (OUTPATIENT)
Dept: DERMATOLOGY | Facility: AMBULATORY SURGERY CENTER | Age: 80
End: 2020-04-08

## 2020-04-08 VITALS
BODY MASS INDEX: 29.62 KG/M2 | TEMPERATURE: 98.1 F | OXYGEN SATURATION: 97 % | HEIGHT: 69 IN | RESPIRATION RATE: 16 BRPM | HEART RATE: 74 BPM | WEIGHT: 200 LBS

## 2020-04-08 DIAGNOSIS — L57.0 ACTINIC KERATOSIS: ICD-10-CM

## 2020-04-08 DIAGNOSIS — L82.0 INFLAMED SEBORRHEIC KERATOSIS: ICD-10-CM

## 2020-04-08 DIAGNOSIS — Z85.828 HISTORY OF NONMELANOMA SKIN CANCER: ICD-10-CM

## 2020-04-08 DIAGNOSIS — L82.1 SEBORRHEIC KERATOSES: ICD-10-CM

## 2020-04-08 DIAGNOSIS — D48.5 NEOPLASM OF UNCERTAIN BEHAVIOR OF SKIN OF FOREHEAD: Primary | ICD-10-CM

## 2020-04-08 DIAGNOSIS — D48.5 NEOPLASM OF UNCERTAIN BEHAVIOR OF SKIN OF FOREARM: ICD-10-CM

## 2020-04-08 DIAGNOSIS — L81.4 LENTIGINES: ICD-10-CM

## 2020-04-08 RX ORDER — ASPIRIN 81 MG/1
81 TABLET ORAL DAILY
COMMUNITY
End: 2021-10-23

## 2020-04-08 RX ORDER — FLUOROURACIL 50 MG/G
CREAM TOPICAL 2 TIMES DAILY
Qty: 40 G | Refills: 0 | Status: SHIPPED | OUTPATIENT
Start: 2020-04-08 | End: 2021-10-23

## 2020-04-08 NOTE — PROGRESS NOTES
Name: Papito Carrizales       Age: 79 y.o. Date: 4/8/2020    Chief Complaint:   Chief Complaint   Patient presents with    Skin Exam     Scalp and temple        Subjective:    HPI:  Mr.. Papito Carrizales is a 79 y.o. male who presents for the evaluation of a lesion on the right forehead and sideburn, 2 on the chest, and one on the back noted by another physician when he was having a steroid injection. He notes the right forehead and sideburn have been present for months, unresolved with 5-Fu and wax and wane. he notes two on the chest have grown, are rough and otherwise without symptoms. He denies symptoms of any lesions on his back. He also desires a refill on the 5-Fu.     ROS: Consitutional: Negative  Dermatological : positive for - skin lesion changes    Pt's hx:  -BCC, right forehead, 5-FU, 05/10/18  -SCCi, right posterior shoulder, 5-FU, 09/05/17  -BCC, right postauricular, Mohs, 04/10/17  -BCC, mid back central, curettage, 03/28/17  -BCC, mid back right, curettage, 03/28/17  -BCC, right cheek, excision, 09/28/16  -BCC, right upper arm, curettage, 03/28/17  -BCC, right chest, curettage, 09/28/16  -BCC, right upper forehead, Mohs, 05/09/16  -SCCi, left forearm, curettage, 02/19/16  -BCC, right forearm, curettage, 02/19/16  -BCC, right lateral eyebrow, Mohs, 02/11/15  -BCC, left postauricular area, Mohs, 07/28/14  -BCC, mid lower back, curettage, 12/10/12  -BCC, right chin, Mohs, 03/04/10  -BCC, right postauricular neck, Mohs 03/04/10  -BCC, right clavicle, curettage, 01/12/10  -[place], left chin, Mohs, 05/30/08  -BCC, left chest, curettage, 05/06/08  -BCCs, upper trunk, prior to pt's first visit     Social History     Socioeconomic History    Marital status:      Spouse name: Not on file    Number of children: Not on file    Years of education: Not on file    Highest education level: Not on file   Occupational History    Not on file   Social Needs    Financial resource strain: Not on file   Anjana Roca Food insecurity     Worry: Not on file     Inability: Not on file    Transportation needs     Medical: Not on file     Non-medical: Not on file   Tobacco Use    Smoking status: Former Smoker     Last attempt to quit: 1972     Years since quittin.8    Smokeless tobacco: Never Used   Substance and Sexual Activity    Alcohol use: No    Drug use: Not on file    Sexual activity: Not on file   Lifestyle    Physical activity     Days per week: Not on file     Minutes per session: Not on file    Stress: Not on file   Relationships    Social connections     Talks on phone: Not on file     Gets together: Not on file     Attends Catholic service: Not on file     Active member of club or organization: Not on file     Attends meetings of clubs or organizations: Not on file     Relationship status: Not on file    Intimate partner violence     Fear of current or ex partner: Not on file     Emotionally abused: Not on file     Physically abused: Not on file     Forced sexual activity: Not on file   Other Topics Concern    Not on file   Social History Narrative    Not on file       Family History   Problem Relation Age of Onset    Cancer Sister         bladder    Cancer Brother         colon cancer    Cancer Other         breast cancer       Past Medical History:   Diagnosis Date    Hypertension     Skin cancer     BCC face, neck       Past Surgical History:   Procedure Laterality Date    COLONOSCOPY N/A 2019    COLONOSCOPY performed by Page Daniels MD at [address]  04/10/2017    [facility] right postauricular by Dr. Gustavus Scheuermann Bilateral     hip replacement    HX ORTHOPAEDIC Left     rotator cuff repair       Current Outpatient Medications   Medication Sig Dispense Refill    vit Q-jsmgdhi-jxpd-rutin-hb196 (BIOFLEX) 334-88-88-74 mg tab Take  by mouth daily.  CLONIDINE HCL PO Take 0.1 mg by mouth daily.       multivitamin (ONE A DAY) tablet Take 1 Tab by mouth daily.  amLODIPine-benazepril (LOTREL) 10-20 mg per capsule Take 1 Cap by mouth daily.  FLUoxetine (PROzac) 20 mg capsule Take 20 mg by mouth daily.  pravastatin (PRAVACHOL) 10 mg tablet Take 40 mg by mouth daily.  aspirin delayed-release 81 mg tablet Take 81 mg by mouth daily.  fluocinoNIDE (LIDEX) 0.05 % ointment Apply  to affected area two (2) times a day. 120 g 1       No Known Allergies      Objective:    Visit Vitals  Pulse 74   Temp 98.1 °F (36.7 °C) (Oral)   Resp 16   Ht 5' 9\" (1.753 m)   Wt 90.7 kg (200 lb)   SpO2 97%   BMI 29.53 kg/m²       Maddie Avila is a 79 y.o. male who appears well and in no distress. He is awake, alert, and oriented. There is no preauricular, submandibular, or cervical lymphadenopathy. A limited skin examination was completed including the face, back, arms, neck, ears. He has thin and thicker AKs on the forearms, right helical rim, lateral neck, lateral cheeks and upper back. He has a pink papule with telangiectasias on the right forehead (at site of prior known BCC that was previously tx with 5-Fu), 10 x 8 mm concerning for incompletely tx BCC. There is a pink papule on the right lateral forehead, just above the temple, 7 x 5 mm - concerning for BCC. There is a pink papule on the left proximal forearm, 5 x 4 mm concerning for BCC. He has scattered seborrheic keratoses - including lesions of concern on the right forehead and right sideburn and chest - many on the head and 2 on the chest that are pink with inflammation. He has lentigines and prior surgical sites without evidence of lesion recurrence. Photos from today's visit:               Assessment/Plan: 1. Neoplasm of Uncertain Behavior, right forehead. The differential diagnoses were discussed. A shave biopsy was advised to sample this lesion. The procedure was reviewed and verbal and written consent were obtained. The risks of pain, bleeding, infection, and scar were discussed.   The patient is aware that this is a sample and is intended for diagnosis and not therapy of the skin lesion. I performed the procedure. The site was cleansed and anesthetized with 1% Lidocaine with Epinephrine 1:100,000. A shave biopsy was performed to sample the lesion. Drysol was used for hemostasis. The wound was bandaged and care reviewed. The specimen was sent to pathology. I will contact the patient with the results and any further treatment that may be necessary. 2.Neoplasm of Uncertain Behavior, right lateral forehead. The differential diagnoses were discussed. A shave biopsy was advised to sample this lesion. The procedure was reviewed and verbal and written consent were obtained. The risks of pain, bleeding, infection, and scar were discussed. The patient is aware that this is a sample and is intended for diagnosis and not therapy of the skin lesion. I performed the procedure. The site was cleansed and anesthetized with 1% Lidocaine with Epinephrine 1:100,000. A shave biopsy was performed to sample the lesion. Drysol was used for hemostasis. The wound was bandaged and care reviewed. The specimen was sent to pathology. I will contact the patient with the results and any further treatment that may be necessary. 3.Neoplasm of Uncertain Behavior, left forearm. The differential diagnoses were discussed. A shave biopsy was advised to sample this lesion. The procedure was reviewed and verbal and written consent were obtained. The risks of pain, bleeding, infection, and scar were discussed. The patient is aware that this is a sample and is intended for diagnosis and not therapy of the skin lesion. I performed the procedure. The site was cleansed and anesthetized with 1% Lidocaine with Epinephrine 1:100,000. A shave biopsy was performed to sample the lesion. Drysol was used for hemostasis. The wound was bandaged and care reviewed. The specimen was sent to pathology.   I will contact the patient with the results and any further treatment that may be necessary. 4.Personal history of skin cancer. I discussed sun protection, sunscreen use, the warning signs of skin cancer, the need for self-skin examinations, and the need for regular practitioner exams. The patient should follow up sooner as needed if new, changing, or symptomatic skin lesions arise. 5.Solar lentigos. The diagnosis and relationship to sun exposure was reviewed. Sun protection advised. 6.Seborrheic keratoses. The diagnosis was reviewed and the patient was reassured that no treatment is needed for these benign lesions. 7. Inflamed seborrheic keratoses. The diagnosis and treatment with liquid nitrogen cryotherapy were reviewed. The risk or persistence or recurrence of the keratosis and the potential for pigment change at the treated site were reviewed. Verbal consent was obtained. I treated 5 lesions with cryotherapy and care was reviewed. 8.Actinic Keratoses. The diagnosis of this precancerous lesion related to sun exposure was reviewed. Verbal consent was obtained. I treated 8 lesions with cryotherapy and post-cryotherapy care was reviewed. [facility]   OFFICE PROCEDURE PROGRESS NOTE   Chart reviewed for the following:   Raul CLINTON, have reviewed the History, Physical and updated the Allergic reactions for Astrid Hairston. TIME OUT performed immediately prior to start of procedure:   Griselda CLINTON, have performed the following reviews on Astrid Plain   prior to the start of the procedure:     * Patient was identified by name and date of birth   * Agreement on procedure being performed was verified   * Risks and Benefits explained to the patient   * Procedure site verified and marked as necessary   * Patient was positioned for comfort   * Consent was signed and verified     Time: 1130  Date of procedure: 4/8/2020  Procedure performed by: Samuel Berrios Gentle  Provider assisted by: lpn   Patient assisted by: self   How tolerated by patient: tolerated the procedure well with no complications   Comments: none

## 2020-04-13 DIAGNOSIS — C44.319 BASAL CELL CARCINOMA (BCC) OF RIGHT FOREHEAD: Primary | ICD-10-CM

## 2020-04-13 NOTE — PROGRESS NOTES
I spoke w/ the pt and he is aware of the diagnosis of BCC x 2 on the forehead for which I suggest Mohs. He would like to use Dr. Bertha Goodrich. The area on the left forearm is benign. I will forward records.

## 2021-03-06 ENCOUNTER — HOSPITAL ENCOUNTER (EMERGENCY)
Age: 81
Discharge: HOME | End: 2021-03-07
Attending: EMERGENCY MEDICINE
Payer: MEDICARE

## 2021-03-06 ENCOUNTER — HOSPITAL ENCOUNTER (EMERGENCY)
Dept: CT IMAGING | Age: 81
Discharge: HOME | End: 2021-03-06
Attending: EMERGENCY MEDICINE
Payer: MEDICARE

## 2021-03-06 DIAGNOSIS — S20.212A CONTUSION OF LEFT CHEST WALL, INITIAL ENCOUNTER: ICD-10-CM

## 2021-03-06 DIAGNOSIS — R10.9 FLANK PAIN: Primary | ICD-10-CM

## 2021-03-06 DIAGNOSIS — R07.89 CHEST WALL PAIN: ICD-10-CM

## 2021-03-06 PROCEDURE — 74011250637 HC RX REV CODE- 250/637: Performed by: EMERGENCY MEDICINE

## 2021-03-06 PROCEDURE — 99284 EMERGENCY DEPT VISIT MOD MDM: CPT

## 2021-03-06 PROCEDURE — 71250 CT THORAX DX C-: CPT

## 2021-03-06 PROCEDURE — 74176 CT ABD & PELVIS W/O CONTRAST: CPT

## 2021-03-06 RX ORDER — OXYCODONE AND ACETAMINOPHEN 5; 325 MG/1; MG/1
1 TABLET ORAL
Status: COMPLETED | OUTPATIENT
Start: 2021-03-06 | End: 2021-03-06

## 2021-03-06 RX ADMIN — OXYCODONE HYDROCHLORIDE AND ACETAMINOPHEN 1 TABLET: 5; 325 TABLET ORAL at 23:06

## 2021-03-07 VITALS
OXYGEN SATURATION: 98 % | WEIGHT: 200 LBS | SYSTOLIC BLOOD PRESSURE: 131 MMHG | DIASTOLIC BLOOD PRESSURE: 73 MMHG | RESPIRATION RATE: 18 BRPM | BODY MASS INDEX: 29.53 KG/M2 | HEART RATE: 62 BPM | TEMPERATURE: 97.4 F

## 2021-03-07 VITALS
TEMPERATURE: 96.8 F | BODY MASS INDEX: 29.62 KG/M2 | OXYGEN SATURATION: 94 % | HEART RATE: 68 BPM | SYSTOLIC BLOOD PRESSURE: 118 MMHG | HEIGHT: 69 IN | DIASTOLIC BLOOD PRESSURE: 57 MMHG | WEIGHT: 200 LBS | RESPIRATION RATE: 18 BRPM

## 2021-03-07 DIAGNOSIS — S20.212A CONTUSION OF LEFT CHEST WALL, INITIAL ENCOUNTER: ICD-10-CM
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
## 2021-03-07 PROCEDURE — 74011000250 HC RX REV CODE- 250: Performed by: EMERGENCY MEDICINE

## 2021-03-07 PROCEDURE — 74011250637 HC RX REV CODE- 250/637: Performed by: EMERGENCY MEDICINE

## 2021-03-07 PROCEDURE — 99283 EMERGENCY DEPT VISIT LOW MDM: CPT

## 2021-03-07 RX ORDER — IBUPROFEN 600 MG/1
600 TABLET ORAL
Status: COMPLETED | OUTPATIENT
Start: 2021-03-07 | End: 2021-03-07

## 2021-03-07 RX ORDER — HYDROCODONE BITARTRATE AND ACETAMINOPHEN 5; 325 MG/1; MG/1
1 TABLET ORAL
Qty: 20 TAB | Refills: 0 | Status: SHIPPED | OUTPATIENT
Start: 2021-03-07 | End: 2021-03-12

## 2021-03-07 RX ORDER — ACETAMINOPHEN 500 MG
1000 TABLET ORAL 3 TIMES DAILY
Qty: 24 TAB | Refills: 0 | Status: SHIPPED | OUTPATIENT
Start: 2021-03-07 | End: 2021-03-11

## 2021-03-07 RX ORDER — HYDROCODONE BITARTRATE AND ACETAMINOPHEN 5; 325 MG/1; MG/1
1 TABLET ORAL
Status: COMPLETED | OUTPATIENT
Start: 2021-03-07 | End: 2021-03-07

## 2021-03-07 RX ORDER — IBUPROFEN 200 MG
400 TABLET ORAL
Qty: 20 TAB | Refills: 0 | Status: SHIPPED | OUTPATIENT
Start: 2021-03-07

## 2021-03-07 RX ORDER — LIDOCAINE 50 MG/G
1 PATCH TOPICAL EVERY 24 HOURS
Qty: 5 PATCH | Refills: 0 | Status: SHIPPED | OUTPATIENT
Start: 2021-03-07 | End: 2021-10-23

## 2021-03-07 RX ORDER — LIDOCAINE 4 G/100G
1 PATCH TOPICAL ONCE
Status: DISCONTINUED | OUTPATIENT
Start: 2021-03-07 | End: 2021-03-07 | Stop reason: HOSPADM

## 2021-03-07 RX ADMIN — HYDROCODONE BITARTRATE AND ACETAMINOPHEN 1 TABLET: 5; 325 TABLET ORAL at 19:31

## 2021-03-07 RX ADMIN — IBUPROFEN 600 MG: 600 TABLET, FILM COATED ORAL at 00:57

## 2021-03-07 NOTE — ED NOTES
Patient medicated for pain. Lights dimmed for comfort. Call bell within reach. Plan of care discussed. Patient placed on monitor x 2.

## 2021-03-07 NOTE — ED TRIAGE NOTES
Patient complains of continued left rib and left flank pain. Patient was seen last night for same.   Patient reports difficulty controlling pain with Motrin and Tylenol

## 2021-03-07 NOTE — ED TRIAGE NOTES
Patient reports slipping while walking down a step causing him to fall and hit his left side on a cooler. Patient reports the event occurred around 6 p.m.

## 2021-03-08 NOTE — ED PROVIDER NOTES
Radha Muñoz is a [de-identified] yo M with left lateral chest wall pain after fall yesterday. He was wearing leather soled shoes and slopped stepping down steps into his garage and landed on his left side on the corner of a cooler. He was seen in this ED last night and had CT of chest abdomen and pelvis which showed no rib fracture or internal injury. He states he he has been taking ibuprofen and tylenol but it has not been helping his pain. Pain is increased with movement and taking a deep breath. He denies fever or cough or blood in his urine.            Past Medical History:   Diagnosis Date    Hypertension     Skin cancer     BCC face, neck       Past Surgical History:   Procedure Laterality Date    COLONOSCOPY N/A 2019    COLONOSCOPY performed by Luis Antonio Whittaker MD at [facility] ENDOSCOPY    [address]X [id number]  04/10/2017    [facility] right postauricular by Dr. Dulce Mayes HX ORTHOPAEDIC Bilateral     hip replacement    HX ORTHOPAEDIC Left     rotator cuff repair         Family History:   Problem Relation Age of Onset   [address] Cancer Sister         bladder    Cancer Brother         colon cancer    Cancer Other         breast cancer       Social History     Socioeconomic History    Marital status:      Spouse name: Not on file    Number of children: Not on file    Years of education: Not on file    Highest education level: Not on file   Occupational History    Not on file   Social Needs    Financial resource strain: Not on file    Food insecurity     Worry: Not on file     Inability: Not on file    Transportation needs     Medical: Not on file     Non-medical: Not on file   Tobacco Use    Smoking status: Former Smoker     Quit date: 1972     Years since quittin.7    Smokeless tobacco: Never Used   Substance and Sexual Activity    Alcohol use: No    Drug use: Not on file    Sexual activity: Not on file   Lifestyle    Physical activity     Days per week: Not on file     Minutes per session: Not on file    Stress: Not on file   Relationships    Social connections     Talks on phone: Not on file     Gets together: Not on file     Attends Mosque service: Not on file     Active member of club or organization: Not on file     Attends meetings of clubs or organizations: Not on file     Relationship status: Not on file    Intimate partner violence     Fear of current or ex partner: Not on file     Emotionally abused: Not on file     Physically abused: Not on file     Forced sexual activity: Not on file   Other Topics Concern    Not on file   Social History Narrative    Not on file         ALLERGIES: Patient has no known allergies. Review of Systems   Constitutional: Negative for fever. HENT: Negative for sore throat. Eyes: Negative for visual disturbance. Respiratory: Negative for cough. Cardiovascular: Positive for chest pain (left lateral chest wall. ). Gastrointestinal: Negative for abdominal pain. Genitourinary: Negative for dysuria. Musculoskeletal: Negative for back pain. Skin: Negative for rash. Neurological: Negative for headaches. Vitals:    03/07/21 1903   BP: 139/60   Pulse: 62   Resp: 18   Temp: 97.4 °F (36.3 °C)   SpO2: 99%   Weight: 90.7 kg (200 lb)            Physical Exam  Vitals signs and nursing note reviewed. Constitutional:       General: He is not in acute distress. Appearance: He is well-developed. HENT:      Head: Normocephalic and atraumatic. Eyes:      Conjunctiva/sclera: Conjunctivae normal.   Neck:      Musculoskeletal: Normal range of motion. Trachea: Phonation normal.   Cardiovascular:      Rate and Rhythm: Normal rate. Pulmonary:      Effort: Pulmonary effort is normal. No respiratory distress. Breath sounds: No wheezing or rales. Chest:      Chest wall: Tenderness present. No crepitus. Abdominal:      General: There is no distension. Tenderness: There is no abdominal tenderness. There is no guarding or rebound. Musculoskeletal: Normal range of motion. General: No tenderness. Skin:     General: Skin is warm and dry. Findings: Bruising (2x2cm, inferiorlateral left chest) present. Neurological:      Mental Status: He is alert. He is not disoriented. Motor: No abnormal muscle tone. MDM   chest wall contusion, with persistent pain. Reviewed CT chest abd/pelvis imaging from yesterday. norco prescribed for pain as well as incentive spirometer.      Procedures

## 2021-03-08 NOTE — ED NOTES
The patient was discharged home by Dr. Celia Gómez and Celia Alva rn in stable condition, accompanied by family. The patient is alert and oriented, is in no respiratory distress and has vital signs within normal limits . The patient's diagnosis, condition and treatment were explained to patient. The patient expressed understanding. No prescriptions given to pt. No work/school note given to pt. A discharge plan has been developed. A  was not involved in the process. Aftercare instructions were given to the patient. pt will transport pt home.

## 2021-10-23 VITALS — BODY MASS INDEX: 28.63 KG/M2 | WEIGHT: 200 LBS | HEIGHT: 70 IN

## 2021-10-23 PROBLEM — M54.12 BRACHIAL NEURITIS: Status: ACTIVE | Noted: 2021-10-23

## 2021-10-23 PROBLEM — R60.0 EDEMA, LOWER EXTREMITY: Status: ACTIVE | Noted: 2021-10-23

## 2021-10-23 PROBLEM — M17.12 DEGENERATIVE ARTHRITIS OF LEFT KNEE: Status: ACTIVE | Noted: 2021-10-23

## 2021-10-23 PROBLEM — M25.462 SWELLING OF LEFT KNEE JOINT: Status: ACTIVE | Noted: 2021-10-23

## 2021-10-23 PROBLEM — M65.351 TRIGGER LITTLE FINGER OF RIGHT HAND: Status: ACTIVE | Noted: 2021-10-23

## 2021-10-23 PROBLEM — M50.30 DEGENERATION OF CERVICAL INTERVERTEBRAL DISC: Status: ACTIVE | Noted: 2021-10-23

## 2021-10-23 PROBLEM — M25.542 ARTHRALGIA OF LEFT HAND: Status: ACTIVE | Noted: 2021-10-23

## 2021-10-23 PROBLEM — Z98.890 S/P TRIGGER FINGER RELEASE: Status: ACTIVE | Noted: 2021-10-23

## 2021-10-23 PROBLEM — M19.042: Status: ACTIVE | Noted: 2021-10-23

## 2021-10-23 PROBLEM — M75.22 BICIPITAL TENDINITIS OF LEFT SHOULDER: Status: ACTIVE | Noted: 2021-10-23

## 2021-10-23 PROBLEM — M72.0 DUPUYTREN'S CONTRACTURE: Status: ACTIVE | Noted: 2021-10-23

## 2021-10-23 PROBLEM — M79.641 RIGHT HAND PAIN: Status: ACTIVE | Noted: 2021-10-23

## 2021-10-23 PROBLEM — M54.2 CERVICALGIA: Status: ACTIVE | Noted: 2021-10-23

## 2021-10-23 PROBLEM — M25.562 LEFT KNEE PAIN: Status: ACTIVE | Noted: 2021-10-23

## 2021-10-23 RX ORDER — AMLODIPINE AND BENAZEPRIL HYDROCHLORIDE 10; 20 MG/1; MG/1
1 CAPSULE ORAL DAILY
COMMUNITY

## 2021-10-23 RX ORDER — GABAPENTIN 100 MG/1
100 CAPSULE ORAL 3 TIMES DAILY
COMMUNITY

## 2021-11-08 ENCOUNTER — OFFICE VISIT (OUTPATIENT)
Dept: ORTHOPEDIC SURGERY | Age: 81
End: 2021-11-08
Payer: MEDICARE

## 2021-11-08 DIAGNOSIS — S83.242A TEAR OF MEDIAL MENISCUS OF LEFT KNEE, CURRENT, INITIAL ENCOUNTER: Primary | ICD-10-CM

## 2021-11-08 PROCEDURE — 99214 OFFICE O/P EST MOD 30 MIN: CPT | Performed by: ORTHOPAEDIC SURGERY

## 2021-11-08 PROCEDURE — G8427 DOCREV CUR MEDS BY ELIG CLIN: HCPCS | Performed by: ORTHOPAEDIC SURGERY

## 2021-11-08 PROCEDURE — 1101F PT FALLS ASSESS-DOCD LE1/YR: CPT | Performed by: ORTHOPAEDIC SURGERY

## 2021-11-08 PROCEDURE — G8536 NO DOC ELDER MAL SCRN: HCPCS | Performed by: ORTHOPAEDIC SURGERY

## 2021-11-08 PROCEDURE — G8419 CALC BMI OUT NRM PARAM NOF/U: HCPCS | Performed by: ORTHOPAEDIC SURGERY

## 2021-11-08 PROCEDURE — G8432 DEP SCR NOT DOC, RNG: HCPCS | Performed by: ORTHOPAEDIC SURGERY

## 2021-11-08 RX ORDER — CLONIDINE HYDROCHLORIDE 0.1 MG/1
TABLET ORAL
COMMUNITY
Start: 2020-11-25

## 2021-11-08 RX ORDER — AMLODIPINE BESYLATE 10 MG/1
TABLET ORAL
COMMUNITY
Start: 2021-08-23 | End: 2021-11-08

## 2021-11-08 NOTE — PROGRESS NOTES
Alfonso Cole (: 1940) is a [de-identified] y.o. male, patient, here for evaluation of the following chief complaint(s):  Knee Pain (MRI results)       HPI:    Patient returns to the office with recurrent discomfort of left knee. He continues to have recurrent pain along the inner aspect of the left knee with positive swelling of the joint. He is now status post MRI evaluation of his knee. No Known Allergies    Current Outpatient Medications   Medication Sig    cloNIDine HCL (CATAPRES) 0.1 mg tablet     gabapentin (NEURONTIN) 100 mg capsule Take 100 mg by mouth three (3) times daily.  amLODIPine-benazepril (LotreL) 10-20 mg per capsule Take 1 Capsule by mouth daily.  ibuprofen (MOTRIN) 200 mg tablet Take 2 Tabs by mouth every six (6) hours as needed for Pain.  vit Y-foiiafv-itto-rutin-hb196 (BIOFLEX) 747-60-14-54 mg tab Take  by mouth daily.  multivitamin (ONE A DAY) tablet Take 1 Tab by mouth daily.  FLUoxetine (PROzac) 20 mg capsule Take 20 mg by mouth daily.  pravastatin (PRAVACHOL) 10 mg tablet Take 40 mg by mouth daily. No current facility-administered medications for this visit.        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    Cervicalgia 10/23/2021    Degeneration of cervical intervertebral disc 10/23/2021    Degenerative arthritis of left hand 10/23/2021    Degenerative arthritis of left knee 10/23/2021    Dupuytren's contracture 10/23/2021    Edema, lower extremity 10/23/2021    Hypercholesterolemia     Hypertension     Left knee pain 10/23/2021    Right hand pain 10/23/2021    S/P trigger finger release 10/23/2021    Skin cancer     BCC face, neck    Swelling of left knee joint 10/23/2021    Trigger little finger of right hand 10/23/2021        Past Surgical History:   Procedure Laterality Date    COLONOSCOPY N/A 2019    COLONOSCOPY performed by Jennifer Perez MD at [address] HX MOHS PROCEDURES  04/10/2017    [address] right postauricular by Dr. Tj Cheung HX ORTHOPAEDIC Bilateral     hip replacement    HX ORTHOPAEDIC Left     rotator cuff repair       Family History   Problem Relation Age of Onset   [facility] Cancer Sister         bladder    Cancer Brother         colon cancer    Cancer Other         breast cancer        Social History     Socioeconomic History    Marital status:      Spouse name: Not on file    Number of children: Not on file    Years of education: Not on file    Highest education level: Not on file   Occupational History    Not on file   Tobacco Use    Smoking status: Former Smoker     Types: Cigarettes     Quit date: 1972     Years since quittin.4    Smokeless tobacco: Never Used   Substance and Sexual Activity    Alcohol use: Yes     Alcohol/week: 3.0 standard drinks     Types: 3 Glasses of wine per week    Drug use: Never    Sexual activity: Not on file   Other Topics Concern    Not on file   Social History Narrative    Not on file     Social Determinants of Health     Financial Resource Strain:     Difficulty of Paying Living Expenses: Not on file   Food Insecurity:     Worried About Running Out of Food in the Last Year: Not on file    Wood of Food in the Last Year: Not on file   Transportation Needs:     Lack of Transportation (Medical): Not on file    Lack of Transportation (Non-Medical):  Not on file   Physical Activity:     Days of Exercise per Week: Not on file    Minutes of Exercise per Session: Not on file   Stress:     Feeling of Stress : Not on file   Social Connections:     Frequency of Communication with Friends and Family: Not on file    Frequency of Social Gatherings with Friends and Family: Not on file    Attends Anglican Services: Not on file    Active Member of Clubs or Organizations: Not on file    Attends Club or Organization Meetings: Not on file    Marital Status: Not on file   Intimate Partner Violence:     Fear of Current or Ex-Partner: Not on file    Emotionally Abused: Not on file    Physically Abused: Not on file    Sexually Abused: Not on file   Housing Stability:     Unable to Pay for Housing in the Last Year: Not on file    Number of [place] in the Last Year: Not on file    Unstable Housing in the Last Year: Not on file       Review of Systems   Constitutional: Negative. HENT: Negative. Eyes: Negative. Cardiovascular: Negative. Gastrointestinal: Negative. Endocrine: Negative. Genitourinary: Negative. Musculoskeletal: Negative. Knee pain   Skin: Negative. Allergic/Immunologic: Negative. Neurological: Negative. Hematological: Negative. Psychiatric/Behavioral: Negative. All other systems reviewed and are negative. Vitals: There were no vitals taken for this visit. There is no height or weight on file to calculate BMI. Ortho Exam     Left knee: Positive effusion. Positive medial joint tenderness. He has no lateral joint tenderness. Daniela's maneuver is positive. He has no swelling noted distally. Neurovascular examination is intact. Right knee: No effusion. Portal sites from prior surgery feel. He has no swelling noted distally. Neurovascular examination is intact. MRI evaluation shows evidence of a posterior horn medial meniscus tear left knee with involvement of the posterior horn root. There are some changes in the body of the lateral meniscus possible small area of horizontal tear of the lateral meniscus    ASSESSMENT/PLAN:      1. Tear of medial meniscus of left knee, current, initial encounter      Below is the assessment and plan developed based on review of pertinent history, physical exam, labs, studies, and medications. I have gone over the MRI with the patient. We discussed operative versus nonoperative management.   He is familiar with the surgery he has had prior arthroscopy on his right knee. However, based on this MRI, it would appear he has a posterior horn root tear and this may require posterior horn root repair which would also require root repair and use of the brace postoperatively. He understands this. Understand the risk and benefits of both operative versus nonoperative management he is leaning more toward surgery. We discussed the risks such as but not limited to postoperative pain recurrent discomfort DVT PE MI CVA and other emphysema events could occur in the preoperative fashion. He understands that this will not eliminate his process of progressive osteoarthritis and he may have recurrent pain he would like to set this up sometime in January 2022    No follow-ups on file. An electronic signature was used to authenticate this note.   -- Britt Niño

## 2021-12-23 DIAGNOSIS — S83.242A TEAR OF MEDIAL MENISCUS OF LEFT KNEE, CURRENT, INITIAL ENCOUNTER: Primary | ICD-10-CM

## 2022-01-14 DIAGNOSIS — S83.242A TEAR OF MEDIAL MENISCUS OF LEFT KNEE, CURRENT, INITIAL ENCOUNTER: Primary | ICD-10-CM

## 2022-01-17 RX ORDER — OXYCODONE AND ACETAMINOPHEN 5; 325 MG/1; MG/1
1 TABLET ORAL
Qty: 30 TABLET | Refills: 0 | Status: SHIPPED | OUTPATIENT
Start: 2022-01-17 | End: 2022-01-22

## 2022-01-26 ENCOUNTER — OFFICE VISIT (OUTPATIENT)
Dept: ORTHOPEDIC SURGERY | Age: 82
End: 2022-01-26
Payer: COMMERCIAL

## 2022-01-26 DIAGNOSIS — Z98.890 STATUS POST ARTHROSCOPY OF LEFT KNEE: Primary | ICD-10-CM

## 2022-01-26 PROCEDURE — 99024 POSTOP FOLLOW-UP VISIT: CPT | Performed by: ORTHOPAEDIC SURGERY

## 2022-01-26 NOTE — LETTER
1/26/2022    Patient: Saravanan Pereira   YOB: 1940   Date of Visit: 1/26/2022     Elysia Watkins MD  [address] [facility]  [address] [facility] [address]  Via Fax: [phone]    Dear Elysia Watkins MD,      Thank you for referring Mr. Saravanan Pereira to [facility] for evaluation. My notes for this consultation are attached. If you have questions, please do not hesitate to call me. I look forward to following your patient along with you.       Sincerely,    Pati La MD

## 2022-01-26 NOTE — PROGRESS NOTES
Rashaun Morrow (: 1940) is a [de-identified] y.o. male, patient, here for evaluation of the following chief complaint(s):  Knee Pain (knee post op)       HPI:    Patient returns to the office status post left knee arthroscopy. He is doing well his pain is controlled. He denies shortness of breath. He denies calf pain. He is here today with his wife. No Known Allergies    Current Outpatient Medications   Medication Sig    cloNIDine HCL (CATAPRES) 0.1 mg tablet     gabapentin (NEURONTIN) 100 mg capsule Take 100 mg by mouth three (3) times daily.  amLODIPine-benazepril (LotreL) 10-20 mg per capsule Take 1 Capsule by mouth daily.  ibuprofen (MOTRIN) 200 mg tablet Take 2 Tabs by mouth every six (6) hours as needed for Pain.  vit X-ngcdgra-xnca-rutin-hb196 (BIOFLEX) 158-17-61-01 mg tab Take  by mouth daily.  multivitamin (ONE A DAY) tablet Take 1 Tab by mouth daily.  FLUoxetine (PROzac) 20 mg capsule Take 20 mg by mouth daily.  pravastatin (PRAVACHOL) 10 mg tablet Take 40 mg by mouth daily. No current facility-administered medications for this visit.        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    Cervicalgia 10/23/2021    Degeneration of cervical intervertebral disc 10/23/2021    Degenerative arthritis of left hand 10/23/2021    Degenerative arthritis of left knee 10/23/2021    Dupuytren's contracture 10/23/2021    Edema, lower extremity 10/23/2021    Hypercholesterolemia     Hypertension     Left knee pain 10/23/2021    Right hand pain 10/23/2021    S/P trigger finger release 10/23/2021    Skin cancer     BCC face, neck    Swelling of left knee joint 10/23/2021    Trigger little finger of right hand 10/23/2021        Past Surgical History:   Procedure Laterality Date    COLONOSCOPY N/A 2019    [facility] ENDOSCOPY    HX MOHS PROCEDURES  04/10/2017    [facility] right postauricular by Dr. Justine Noriega HX ORTHOPAEDIC Bilateral     hip replacement    HX ORTHOPAEDIC Left     rotator cuff repair       Family History   Problem Relation Age of Onset   [place] Cancer Sister         bladder    Cancer Brother         colon cancer    Cancer Other         breast cancer        Social History     Socioeconomic History    Marital status:      Spouse name: Not on file    Number of children: Not on file    Years of education: Not on file    Highest education level: Not on file   Occupational History    Not on file   Tobacco Use    Smoking status: Former Smoker     Types: Cigarettes     Quit date: 1972     Years since quittin.6    Smokeless tobacco: Never Used   Substance and Sexual Activity    Alcohol use: Yes     Alcohol/week: 3.0 standard drinks     Types: 3 Glasses of wine per week    Drug use: Never    Sexual activity: Not on file   Other Topics Concern    Not on file   Social History Narrative    Not on file     Social Determinants of Health     Financial Resource Strain:     Difficulty of Paying Living Expenses: Not on file   Food Insecurity:     Worried About Running Out of Food in the Last Year: Not on file    Wood of Food in the Last Year: Not on file   Transportation Needs:     Lack of Transportation (Medical): Not on file    Lack of Transportation (Non-Medical):  Not on file   Physical Activity:     Days of Exercise per Week: Not on file    Minutes of Exercise per Session: Not on file   Stress:     Feeling of Stress : Not on file   Social Connections:     Frequency of Communication with Friends and Family: Not on file    Frequency of Social Gatherings with Friends and Family: Not on file    Attends Mu-ism Services: Not on file    Active Member of Clubs or Organizations: Not on file    Attends Club or Organization Meetings: Not on file    Marital Status: Not on file   Intimate Partner Violence:     Fear of Current or Ex-Partner: Not on file    Emotionally Abused: Not on file    Physically Abused: Not on file    Sexually Abused: Not on file   Housing Stability:     Unable to Pay for Housing in the Last Year: Not on file    Number of Places Lived in the Last Year: Not on file    Unstable Housing in the Last Year: Not on file       Review of Systems   Musculoskeletal:        Knee post op       Vitals: There were no vitals taken for this visit. There is no height or weight on file to calculate BMI. Ortho Exam     Left knee: Portal sites are healing well. He has minimal soft tissue swelling minimal effusion. Calf is soft and supple. He has near full range of motion of the knee joint. Neurovascular examination is intact. ASSESSMENT/PLAN:    She is doing well. I would recommend physical therapy. I have also gone over a physician directed home exercise program.  We have discussed management of his portal sites.   He is to return to the office in 4 weeks        Elsy French MD

## 2022-02-09 ENCOUNTER — APPOINTMENT (OUTPATIENT)
Dept: ULTRASOUND IMAGING | Age: 82
End: 2022-02-09
Payer: MEDICARE

## 2022-02-09 ENCOUNTER — HOSPITAL ENCOUNTER (EMERGENCY)
Age: 82
Discharge: HOME | End: 2022-02-09
Attending: EMERGENCY MEDICINE
Payer: MEDICARE

## 2022-02-09 ENCOUNTER — OFFICE VISIT (OUTPATIENT)
Dept: ORTHOPEDIC SURGERY | Age: 82
End: 2022-02-09
Payer: MEDICARE

## 2022-02-09 VITALS
HEART RATE: 75 BPM | BODY MASS INDEX: 30.36 KG/M2 | WEIGHT: 212.08 LBS | TEMPERATURE: 97.7 F | DIASTOLIC BLOOD PRESSURE: 77 MMHG | HEIGHT: 70 IN | SYSTOLIC BLOOD PRESSURE: 146 MMHG | RESPIRATION RATE: 18 BRPM | OXYGEN SATURATION: 99 %

## 2022-02-09 DIAGNOSIS — Z98.890 STATUS POST ARTHROSCOPY OF LEFT KNEE: Primary | ICD-10-CM

## 2022-02-09 DIAGNOSIS — M79.89 SWELLING OF LEFT LOWER EXTREMITY: Primary | ICD-10-CM

## 2022-02-09 PROCEDURE — 99024 POSTOP FOLLOW-UP VISIT: CPT | Performed by: ORTHOPAEDIC SURGERY

## 2022-02-09 PROCEDURE — 93971 EXTREMITY STUDY: CPT

## 2022-02-09 PROCEDURE — 99284 EMERGENCY DEPT VISIT MOD MDM: CPT

## 2022-02-09 NOTE — PROGRESS NOTES
Saravanan Pereira (: 1940) is a [de-identified] y.o. male, patient, here for evaluation of the following chief complaint(s):  Knee Pain (left knee pain)       HPI:    Patient presents the office today status post left knee arthroscopy. He is knee is doing well but he is describing more of swelling and type of discomfort into his leg region. He has been seeing his physical therapist about once a week. He recently saw his physical therapist.  He is here today 1 week early. He denies shortness of breath. He denies numbness or tingling. No Known Allergies    Current Outpatient Medications   Medication Sig    cloNIDine HCL (CATAPRES) 0.1 mg tablet     gabapentin (NEURONTIN) 100 mg capsule Take 100 mg by mouth three (3) times daily.  amLODIPine-benazepril (LotreL) 10-20 mg per capsule Take 1 Capsule by mouth daily.  ibuprofen (MOTRIN) 200 mg tablet Take 2 Tabs by mouth every six (6) hours as needed for Pain.  vit Z-zfdlbdz-tssm-rutin-hb196 (BIOFLEX) 889-18-30-93 mg tab Take  by mouth daily.  multivitamin (ONE A DAY) tablet Take 1 Tab by mouth daily.  FLUoxetine (PROzac) 20 mg capsule Take 20 mg by mouth daily.  pravastatin (PRAVACHOL) 10 mg tablet Take 40 mg by mouth daily. No current facility-administered medications for this visit.        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    Cervicalgia 10/23/2021    Degeneration of cervical intervertebral disc 10/23/2021    Degenerative arthritis of left hand 10/23/2021    Degenerative arthritis of left knee 10/23/2021    Dupuytren's contracture 10/23/2021    Edema, lower extremity 10/23/2021    Hypercholesterolemia     Hypertension     Left knee pain 10/23/2021    Right hand pain 10/23/2021    S/P trigger finger release 10/23/2021    Skin cancer     BCC face, neck    Swelling of left knee joint 10/23/2021    Trigger little finger of right hand 10/23/2021        Past Surgical History:   Procedure Laterality Date    COLONOSCOPY N/A 2019    COLONOSCOPY performed by Paul Negron MD at [address] HX MOHS PROCEDURES  04/10/2017    [address] right postauricular by Dr. Gladys Tamayo HX ORTHOPAEDIC Bilateral     hip replacement    HX ORTHOPAEDIC Left     rotator cuff repair       Family History   Problem Relation Age of Onset   [place] Cancer Sister         bladder    Cancer Brother         colon cancer    Cancer Other         breast cancer        Social History     Socioeconomic History    Marital status:      Spouse name: Not on file    Number of children: Not on file    Years of education: Not on file    Highest education level: Not on file   Occupational History    Not on file   Tobacco Use    Smoking status: Former Smoker     Types: Cigarettes     Quit date: 1972     Years since quittin.7    Smokeless tobacco: Never Used   Substance and Sexual Activity    Alcohol use: Yes     Alcohol/week: 3.0 standard drinks     Types: 3 Glasses of wine per week    Drug use: Never    Sexual activity: Not on file   Other Topics Concern    Not on file   Social History Narrative    Not on file     Social Determinants of Health     Financial Resource Strain:     Difficulty of Paying Living Expenses: Not on file   Food Insecurity:     Worried About Running Out of Food in the Last Year: Not on file    Wood of Food in the Last Year: Not on file   Transportation Needs:     Lack of Transportation (Medical): Not on file    Lack of Transportation (Non-Medical):  Not on file   Physical Activity:     Days of Exercise per Week: Not on file    Minutes of Exercise per Session: Not on file   Stress:     Feeling of Stress : Not on file   Social Connections:     Frequency of Communication with Friends and Family: Not on file    Frequency of Social Gatherings with Friends and Family: Not on file    Attends Synagogue Services: Not on file    Active Member of Clubs or Organizations: Not on file    Attends Club or Organization Meetings: Not on file    Marital Status: Not on file   Intimate Partner Violence:     Fear of Current or Ex-Partner: Not on file    Emotionally Abused: Not on file    Physically Abused: Not on file    Sexually Abused: Not on file   Housing Stability:     Unable to Pay for Housing in the Last Year: Not on file    Number of [place] in the Last Year: Not on file    Unstable Housing in the Last Year: Not on file       Review of Systems   Musculoskeletal:        Left knee pain       Vitals: There were no vitals taken for this visit. There is no height or weight on file to calculate BMI. Ortho Exam     Left knee: Portal sites of healed well. He has minimal effusion. He has full range of motion of the knee with minimal pain. He does carry edema +1 pitting edema along the distal portion of the leg. His calf is nontender but he does have edema. Neurovascular examination is intact. ASSESSMENT/PLAN:    First and foremost I would recommend a trip to the emergency room here today for Doppler study to make sure there is no evidence of DVT. If he does not have any evidence of a DVT I would ask him to continue with physical therapy. I would also recommend a compression hose and elevation. In time this will resolve. If the Doppler study is positive, I will be notified and depending on the finding we we will have to start treatment.   Otherwise he is to continue with physical therapy and return to the office in 3 to 4 weeks        Talia Ugarte MD

## 2022-02-09 NOTE — ED TRIAGE NOTES
Pt was sent here by doctor for evaluation of blood clot in left leg. He had a meniscus repair in the left knee on January 13th.

## 2022-02-09 NOTE — ED PROVIDER NOTES
Date of Service:  2/9/2022    Patient:  Wilfred Sacks    Chief Complaint:  Leg Swelling       HPI:  Wilfred Sacks is a [de-identified] y.o.  male who presents for evaluation of left lower extremity swelling. Patient states that no trauma or injury. He was sent by his orthopedic doctor to the ER to rule out a possible blood clot. Patient states recent surgery to repair a torn left meniscus on January 18, 2022. Patient denies any accompanying symptoms such as chest pain shortness of breath abdominal pain. Denies any left lower leg pain in ER        The history is provided by the patient. Leg Swelling  This is a new problem. The problem has not changed since onset. Pertinent negatives include no chest pain, no abdominal pain, no headaches and no shortness of breath. Nothing aggravates the symptoms. Nothing relieves the symptoms. He has tried nothing for the symptoms.         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    Cervicalgia 10/23/2021    Degeneration of cervical intervertebral disc 10/23/2021    Degenerative arthritis of left hand 10/23/2021    Degenerative arthritis of left knee 10/23/2021    Dupuytren's contracture 10/23/2021    Edema, lower extremity 10/23/2021    Hypercholesterolemia     Hypertension     Left knee pain 10/23/2021    Right hand pain 10/23/2021    S/P trigger finger release 10/23/2021    Skin cancer     BCC face, neck    Swelling of left knee joint 10/23/2021    Trigger little finger of right hand 10/23/2021       Past Surgical History:   Procedure Laterality Date    COLONOSCOPY N/A 8/27/2019    COLONOSCOPY performed by Paul Negron MD at [address]  04/10/2017    [facility] right postauricular by Dr. Gladys Tamayo HX ORTHOPAEDIC Bilateral     hip replacement    HX ORTHOPAEDIC Left     rotator cuff repair         Family History:   Problem Relation Age of Onset    Cancer Sister         bladder    Cancer Brother         colon cancer    Cancer Other         breast cancer       Social History     Socioeconomic History    Marital status:      Spouse name: Not on file    Number of children: Not on file    Years of education: Not on file    Highest education level: Not on file   Occupational History    Not on file   Tobacco Use    Smoking status: Former Smoker     Types: Cigarettes     Quit date: 1972     Years since quittin.7    Smokeless tobacco: Never Used   Substance and Sexual Activity    Alcohol use: Yes     Alcohol/week: 3.0 standard drinks     Types: 3 Glasses of wine per week    Drug use: Never    Sexual activity: Not on file   Other Topics Concern    Not on file   Social History Narrative    Not on file     Social Determinants of Health     Financial Resource Strain:     Difficulty of Paying Living Expenses: Not on file   Food Insecurity:     Worried About Running Out of Food in the Last Year: Not on file    Wood of Food in the Last Year: Not on file   Transportation Needs:     Lack of Transportation (Medical): Not on file    Lack of Transportation (Non-Medical):  Not on file   Physical Activity:     Days of Exercise per Week: Not on file    Minutes of Exercise per Session: Not on file   Stress:     Feeling of Stress : Not on file   Social Connections:     Frequency of Communication with Friends and Family: Not on file    Frequency of Social Gatherings with Friends and Family: Not on file    Attends Pentecostal Services: Not on file    Active Member of Clubs or Organizations: Not on file    Attends Club or Organization Meetings: Not on file    Marital Status: Not on file   Intimate Partner Violence:     Fear of Current or Ex-Partner: Not on file    Emotionally Abused: Not on file    Physically Abused: Not on file    Sexually Abused: Not on file   Housing Stability:     Unable to Pay for Housing in the Last Year: Not on file    Number of Places Lived in the Last Year: Not on file    Unstable Housing in the Last Year: Not on file         ALLERGIES: Patient has no known allergies. Review of Systems   Constitutional: Negative for chills and fever. HENT: Negative for facial swelling. Eyes: Negative for visual disturbance. Respiratory: Negative for cough and shortness of breath. Cardiovascular: Negative for chest pain. Gastrointestinal: Negative for abdominal pain. Genitourinary: Negative for dysuria. Musculoskeletal: Negative for joint swelling. Skin: Negative for rash. Allergic/Immunologic: Negative for immunocompromised state. Neurological: Negative for headaches. Psychiatric/Behavioral: Negative for agitation. All other systems reviewed and are negative. Vitals:    02/09/22 1615 02/09/22 1630 02/09/22 1700 02/09/22 1718   BP: (!) 140/79 135/77 (!) 146/77    Pulse:       Resp:       Temp:       SpO2: 99% 98% 98% 99%   Weight:       Height:                Physical Exam  Vitals and nursing note reviewed. Constitutional:       General: He is not in acute distress. HENT:      Head: Atraumatic. Eyes:      Conjunctiva/sclera: Conjunctivae normal.   Cardiovascular:      Rate and Rhythm: Normal rate and regular rhythm. Heart sounds: No murmur heard. Pulmonary:      Effort: Pulmonary effort is normal.      Breath sounds: Normal breath sounds. Abdominal:      Palpations: Abdomen is soft. Tenderness: There is no abdominal tenderness. Musculoskeletal:         General: Swelling present. No tenderness. Normal range of motion. Cervical back: Normal range of motion. Comments: Swelling of left lower extremity. No tenderness on exam. Negative Andree's sign   Skin:     General: Skin is warm and dry. Neurological:      Mental Status: He is alert and oriented to person, place, and time. Mental status is at baseline.           MDM       Procedures      VITAL SIGNS:  Patient Vitals for the past 4 hrs:   Temp Pulse Resp BP SpO2   02/09/22 1718     99 %   02/09/22 1700    (!) 146/77 98 %   02/09/22 1630    135/77 98 %   02/09/22 1615    (!) 140/79 99 %   02/09/22 1604 97.7 °F (36.5 °C) 75 18 (!) 150/77 100 %         LABS:  No results found for this or any previous visit (from the past 6 hour(s)). IMAGING:  DUPLEX LOWER EXT VENOUS LEFT   Final Result            Medications During Visit:  Medications - No data to display      DECISION MAKING:  Jose A Botello is a [de-identified] y.o. male who comes in as above. Ultrasound showed no evidence of acute deep vein thrombosis. Results reviewed with patient. Instructed to follow-up with orthopedic surgeon. Retrurn precautions given      IMPRESSION:  1. Swelling of left lower extremity        DISPOSITION:        Discharge Medication List as of 2/9/2022  5:25 PM           Follow-up Information     Follow up With Specialties Details Why Contact Info    Curtis Cruz MD Orthopedic Surgery  If symptoms worsen [facility] [address]  [facility] [address]  [phone]      [facility] Emergency Medicine   [place] [address] [phone]            The patient is asked to follow-up with their primary care provider in the next several days. They are to call tomorrow for an appointment. The patient is asked to return promptly for any increased concerns or worsening of symptoms. They can return to this emergency department or any other emergency department.

## 2022-03-18 PROBLEM — M25.542 ARTHRALGIA OF LEFT HAND: Status: ACTIVE | Noted: 2021-10-23

## 2022-03-18 PROBLEM — M50.30 DEGENERATION OF CERVICAL INTERVERTEBRAL DISC: Status: ACTIVE | Noted: 2021-10-23

## 2022-03-18 PROBLEM — M19.042: Status: ACTIVE | Noted: 2021-10-23

## 2022-03-18 PROBLEM — R60.0 EDEMA, LOWER EXTREMITY: Status: ACTIVE | Noted: 2021-10-23

## 2022-03-18 PROBLEM — M25.562 LEFT KNEE PAIN: Status: ACTIVE | Noted: 2021-10-23

## 2022-03-18 PROBLEM — Z98.890 S/P TRIGGER FINGER RELEASE: Status: ACTIVE | Noted: 2021-10-23

## 2022-03-18 PROBLEM — M54.12 BRACHIAL NEURITIS: Status: ACTIVE | Noted: 2021-10-23

## 2022-03-19 PROBLEM — M54.2 CERVICALGIA: Status: ACTIVE | Noted: 2021-10-23

## 2022-03-19 PROBLEM — M72.0 DUPUYTREN'S CONTRACTURE: Status: ACTIVE | Noted: 2021-10-23

## 2022-03-19 PROBLEM — M65.351 TRIGGER LITTLE FINGER OF RIGHT HAND: Status: ACTIVE | Noted: 2021-10-23

## 2022-03-19 PROBLEM — M79.641 RIGHT HAND PAIN: Status: ACTIVE | Noted: 2021-10-23

## 2022-03-19 PROBLEM — M75.22 BICIPITAL TENDINITIS OF LEFT SHOULDER: Status: ACTIVE | Noted: 2021-10-23

## 2022-03-20 PROBLEM — M17.12 DEGENERATIVE ARTHRITIS OF LEFT KNEE: Status: ACTIVE | Noted: 2021-10-23

## 2022-03-20 PROBLEM — M25.462 SWELLING OF LEFT KNEE JOINT: Status: ACTIVE | Noted: 2021-10-23

## 2022-09-21 ENCOUNTER — OFFICE VISIT (OUTPATIENT)
Dept: ORTHOPEDIC SURGERY | Age: 82
End: 2022-09-21
Payer: MEDICARE

## 2022-09-21 DIAGNOSIS — M17.12 PRIMARY OSTEOARTHRITIS OF LEFT KNEE: Primary | ICD-10-CM

## 2022-09-21 DIAGNOSIS — M25.462 KNEE EFFUSION, LEFT: ICD-10-CM

## 2022-09-21 PROCEDURE — 1101F PT FALLS ASSESS-DOCD LE1/YR: CPT | Performed by: ORTHOPAEDIC SURGERY

## 2022-09-21 PROCEDURE — G8536 NO DOC ELDER MAL SCRN: HCPCS | Performed by: ORTHOPAEDIC SURGERY

## 2022-09-21 PROCEDURE — G8417 CALC BMI ABV UP PARAM F/U: HCPCS | Performed by: ORTHOPAEDIC SURGERY

## 2022-09-21 PROCEDURE — 99214 OFFICE O/P EST MOD 30 MIN: CPT | Performed by: ORTHOPAEDIC SURGERY

## 2022-09-21 PROCEDURE — 1123F ACP DISCUSS/DSCN MKR DOCD: CPT | Performed by: ORTHOPAEDIC SURGERY

## 2022-09-21 PROCEDURE — G8427 DOCREV CUR MEDS BY ELIG CLIN: HCPCS | Performed by: ORTHOPAEDIC SURGERY

## 2022-09-21 PROCEDURE — 20610 DRAIN/INJ JOINT/BURSA W/O US: CPT | Performed by: ORTHOPAEDIC SURGERY

## 2022-09-21 PROCEDURE — G8432 DEP SCR NOT DOC, RNG: HCPCS | Performed by: ORTHOPAEDIC SURGERY

## 2022-09-21 RX ORDER — TRIAMCINOLONE ACETONIDE 40 MG/ML
80 INJECTION, SUSPENSION INTRA-ARTICULAR; INTRAMUSCULAR ONCE
Status: COMPLETED | OUTPATIENT
Start: 2022-09-21 | End: 2022-09-21

## 2022-09-21 RX ADMIN — TRIAMCINOLONE ACETONIDE 80 MG: 40 INJECTION, SUSPENSION INTRA-ARTICULAR; INTRAMUSCULAR at 18:21

## 2022-09-21 NOTE — PROGRESS NOTES
Stewart Abernathy. (: 1940) is a 81 y.o. male, patient, here for evaluation of the following chief complaint(s):  Knee Pain (Patient here for post op knee arthroscopy.)       HPI:    Patient presents to the office today with a chief complaint of left knee pain. This patient has seen before in the past he has a known history of moderate osteoarthritis of the left knee. He is also status post arthroscopy of the left knee. States he has carried a little bit of swelling in the knee. As of lately, the pain and the swelling is gotten a little bit worse. He describes discomfort more in the center portion of the knee. He particularly notices pain when he is getting up out of a seated or kneeling position. He denies any recent level trauma. He denies catching or popping. No Known Allergies    Current Outpatient Medications   Medication Sig    cloNIDine HCL (CATAPRES) 0.1 mg tablet     gabapentin (NEURONTIN) 100 mg capsule Take 100 mg by mouth three (3) times daily. amLODIPine-benazepril (LotreL) 10-20 mg per capsule Take 1 Capsule by mouth daily. ibuprofen (MOTRIN) 200 mg tablet Take 2 Tabs by mouth every six (6) hours as needed for Pain.    vit U-peotsov-ttek-rutin-hb196 (BIOFLEX) 654-22-34-61 mg tab Take  by mouth daily. multivitamin (ONE A DAY) tablet Take 1 Tab by mouth daily. FLUoxetine (PROzac) 20 mg capsule Take 20 mg by mouth daily. pravastatin (PRAVACHOL) 10 mg tablet Take 40 mg by mouth daily. No current facility-administered medications for this visit.        Past Medical History:   Diagnosis Date    Arthralgia of left hand 10/23/2021    Bicipital tendinitis of left shoulder 10/23/2021    Brachial neuritis 10/23/2021    Cervicalgia 10/23/2021    Degeneration of cervical intervertebral disc 10/23/2021    Degenerative arthritis of left hand 10/23/2021    Degenerative arthritis of left knee 10/23/2021    Dupuytren's contracture 10/23/2021    Edema, lower extremity 10/23/2021 Hypercholesterolemia     Hypertension     Left knee pain 10/23/2021    Right hand pain 10/23/2021    S/P trigger finger release 10/23/2021    Skin cancer     BCC face, neck    Swelling of left knee joint 10/23/2021    Trigger little finger of right hand 10/23/2021        Past Surgical History:   Procedure Laterality Date    COLONOSCOPY N/A 2019    COLONOSCOPY performed by Rita Trimble MD at [facility]  04/10/2017    [facility] right postauricular by Dr. Anglin Client Bilateral     hip replacement    HX ORTHOPAEDIC Left     rotator cuff repair       Family History   Problem Relation Age of Onset    Cancer Sister         bladder    Cancer Brother         colon cancer    Cancer Other         breast cancer        Social History     Socioeconomic History    Marital status:      Spouse name: Not on file    Number of children: Not on file    Years of education: Not on file    Highest education level: Not on file   Occupational History    Not on file   Tobacco Use    Smoking status: Former     Types: Cigarettes     Quit date: 1972     Years since quittin.3    Smokeless tobacco: Never   Substance and Sexual Activity    Alcohol use: Yes     Alcohol/week: 3.0 standard drinks     Types: 3 Glasses of wine per week    Drug use: Never    Sexual activity: Not on file   Other Topics Concern    Not on file   Social History Narrative    Not on file     Social Determinants of Health     Financial Resource Strain: Not on file   Food Insecurity: Not on file   Transportation Needs: Not on file   Physical Activity: Not on file   Stress: Not on file   Social Connections: Not on file   Intimate Partner Violence: Not on file   Housing Stability: Not on file       Review of Systems   Musculoskeletal:         Post op knee scope     Vitals: There were no vitals taken for this visit. There is no height or weight on file to calculate BMI.     Ortho Exam       Patient is alert and oriented x3. Patient is in no acute distress. Patient ambulates with a nonantalgic gait. Left knee: Portal sites of healed. He has a mild to moderate effusion. Range of motion is near full. He has mild crepitation at the patella and mild pain with manipulation of the patella. He has no medial lateral joint tenderness. His collateral ligaments are intact. Anterior drawer and posterior drawer negative. No swelling is noted distally. Neurovascular examination is intact. Right knee: There is no abrasions, lacerations, ecchymosis or soft tissue swelling. No effusion is identified. There is no pain to palpation along the medial or lateral border of the patella. There is no pain or crepitation with manipulation of the patella. There is normal excursion of the patella. Patellar grind test is negative. Active and passive range of motion is full and does not cause pain or crepitation. There is no pain with palpation along the medial femoral epicondyle or medial tibia and no pain with palpation over the lateral femoral epicondyle. There is no medial or lateral joint line tenderness. Daniela's maneuver is negative. There is no collateral ligament instability. Anterior drawer, Lachman and posterior drawer are negative. There is no soft tissue swelling distally into the leg. Neurocirculatory examination is intact. I have looked at his prior images and his prior arthroscopic pictures. He had evidence of mild meniscus pathology. He had some mild grade 2 and 3 changes with his biggest area of cartilage change noted along the patella    ASSESSMENT/PLAN:    Patient presents to the office today with moderate osteoarthritis left knee and recurrent effusion. I would state the arthritis seems to be the biggest culprit to his current symptoms. We have discussed treatment options moving forward and he is elected proceed with a cortisone injection. I think this is reasonable and appropriate.   I have gone over the risks and benefits and he would like to proceed. Consent for the injection was obtained. Risk of postinjection infection, lack of improvement, hypopigmentation and unusual allergic reaction were explained to the patient. After consent, the skin was sterilely prepped and 80 mg of triamcinolone and 5 cc of 0.25% plain Marcaine was was injected in the left knee. 30 cc of sterile fluid was drained out of the knee. Patient had no complications. Patient is to ice modify activities for 24 hours.   Patient is to return to the office if no improvement        Lula Guerin MD

## 2022-09-21 NOTE — LETTER
9/21/2022    Patient: Nat Sanchez. YOB: 1940   Date of Visit: 9/21/2022     Nicole Dang MD  [address]  [address]   Via Fax: [phone]    Dear Nicole Dang MD,      Thank you for referring Mr. Nitin Noguera to [facility] for evaluation. My notes for this consultation are attached. If you have questions, please do not hesitate to call me. I look forward to following your patient along with you.       Sincerely,    Tamar Hensley MD

## 2023-01-04 ENCOUNTER — OFFICE VISIT (OUTPATIENT)
Dept: ORTHOPEDIC SURGERY | Age: 83
End: 2023-01-04
Payer: MEDICARE

## 2023-01-04 DIAGNOSIS — M17.12 PRIMARY OSTEOARTHRITIS OF LEFT KNEE: Primary | ICD-10-CM

## 2023-01-04 DIAGNOSIS — M25.462 KNEE EFFUSION, LEFT: ICD-10-CM

## 2023-01-04 RX ORDER — BUPIVACAINE HYDROCHLORIDE 2.5 MG/ML
5 INJECTION, SOLUTION INFILTRATION; PERINEURAL ONCE
Status: COMPLETED | OUTPATIENT
Start: 2023-01-04 | End: 2023-01-04

## 2023-01-04 RX ORDER — METHYLPREDNISOLONE ACETATE 80 MG/ML
80 INJECTION, SUSPENSION INTRA-ARTICULAR; INTRALESIONAL; INTRAMUSCULAR; SOFT TISSUE ONCE
Status: COMPLETED | OUTPATIENT
Start: 2023-01-04 | End: 2023-01-04

## 2023-01-04 RX ADMIN — BUPIVACAINE HYDROCHLORIDE 12.5 MG: 2.5 INJECTION, SOLUTION INFILTRATION; PERINEURAL at 12:37

## 2023-01-04 RX ADMIN — METHYLPREDNISOLONE ACETATE 80 MG: 80 INJECTION, SUSPENSION INTRA-ARTICULAR; INTRALESIONAL; INTRAMUSCULAR; SOFT TISSUE at 12:37

## 2023-01-04 NOTE — PROGRESS NOTES
Alondra De La Fuente (: 1940) is a 81 y.o. male, patient, here for evaluation of the following chief complaint(s):  Knee Pain (Left knee pain )       HPI:    Patient presents the office today with a chief plaint of left knee pain. This is a patient known to my office who has a known history of osteoarthritis of the knee. He is also occasionally developed recurrent effusion of the knee joint. Patient states he has noted recurrent discomfort. Last seen in the office he had a cortisone injection and did quite well. He describes discomfort mostly getting up out of a chair and going up and down steps. He notices tightness of the knee. No Known Allergies    Current Outpatient Medications   Medication Sig    cloNIDine HCL (CATAPRES) 0.1 mg tablet     gabapentin (NEURONTIN) 100 mg capsule Take 100 mg by mouth three (3) times daily. amLODIPine-benazepril (LotreL) 10-20 mg per capsule Take 1 Capsule by mouth daily. ibuprofen (MOTRIN) 200 mg tablet Take 2 Tabs by mouth every six (6) hours as needed for Pain.    vit D-oaqqbpp-fhqf-rutin-hb196 (BIOFLEX) 088-22-70-23 mg tab Take  by mouth daily. multivitamin (ONE A DAY) tablet Take 1 Tab by mouth daily. FLUoxetine (PROzac) 20 mg capsule Take 20 mg by mouth daily. pravastatin (PRAVACHOL) 10 mg tablet Take 40 mg by mouth daily. No current facility-administered medications for this visit.        Past Medical History:   Diagnosis Date    Arthralgia of left hand 10/23/2021    Bicipital tendinitis of left shoulder 10/23/2021    Brachial neuritis 10/23/2021    Cervicalgia 10/23/2021    Degeneration of cervical intervertebral disc 10/23/2021    Degenerative arthritis of left hand 10/23/2021    Degenerative arthritis of left knee 10/23/2021    Dupuytren's contracture 10/23/2021    Edema, lower extremity 10/23/2021    Hypercholesterolemia     Hypertension     Left knee pain 10/23/2021    Right hand pain 10/23/2021    S/P trigger finger release 10/23/2021 Skin cancer     BCC face, neck    Swelling of left knee joint 10/23/2021    Trigger little finger of right hand 10/23/2021        Past Surgical History:   Procedure Laterality Date    COLONOSCOPY N/A 2019    COLONOSCOPY performed by Margarette Mena MD at [facility] [place]OSCOPY    [place]  04/[place]    [address] right postauricular by Dr. Camilo Marquis Bilateral     hip replacement    HX ORTHOPAEDIC Left     rotator cuff repair       Family History   Problem Relation Age of Onset    Cancer Sister         bladder    Cancer Brother         colon cancer    Cancer Other         breast cancer        Social History     Socioeconomic History    Marital status:      Spouse name: Not on file    Number of children: Not on file    Years of education: Not on file    Highest education level: Not on file   Occupational History    Not on file   Tobacco Use    Smoking status: Former     Types: Cigarettes     Quit date: 1972     Years since quittin.6    Smokeless tobacco: Never   Substance and Sexual Activity    Alcohol use: Yes     Alcohol/week: 3.0 standard drinks     Types: 3 Glasses of wine per week    Drug use: Never    Sexual activity: Not on file   Other Topics Concern    Not on file   Social History Narrative    Not on file     Social Determinants of Health     Financial Resource Strain: Not on file   Food Insecurity: Not on file   Transportation Needs: Not on file   Physical Activity: Not on file   Stress: Not on file   Social Connections: Not on file   Intimate Partner Violence: Not on file   Housing Stability: Not on file       Review of Systems   Musculoskeletal:         Left knee      Vitals: There were no vitals taken for this visit. There is no height or weight on file to calculate BMI. Ortho Exam     Patient is alert and oriented x3. Patient is in no acute distress. Patient ambulates with a nonantalgic gait. Left knee: 2+ effusion is noted.   Patient has range of motion 0-130 degrees. There is mild pain with manipulation the patella. Positive crepitation with manipulation of the patella. Positive medial joint line tenderness is noted. Patient's pain is slightly worsened with Daniela's maneuver. There is no lateral joint tenderness. Collateral ligaments are intact. Anterior drawer and posterior drawer negative. Lachman's test negative. There is no soft tissue swelling distally. Neurovascular examination is intact. Right knee: There is no abrasions, lacerations, ecchymosis or soft tissue swelling. No effusion is identified. There is no pain to palpation along the medial or lateral border of the patella. There is no pain or crepitation with manipulation of the patella. There is normal excursion of the patella. Patellar grind test is negative. Active and passive range of motion is full and does not cause pain or crepitation. There is no pain with palpation along the medial femoral epicondyle or medial tibia and no pain with palpation over the lateral femoral epicondyle. There is no medial or lateral joint line tenderness. Daniela's maneuver is negative. There is no collateral ligament instability. Anterior drawer, Lachman and posterior drawer are negative. There is no soft tissue swelling distally into the leg. Neurocirculatory examination is intact. Prior x-rays were reevaluated he does have moderate osteoarthritis of the left knee joint. ASSESSMENT/PLAN:    We have discussed options moving forward. He has done well with cortisone junction. We also talked about viscosupplementation. He brought up total knee replacement. Based on his last x-ray, he does not have enough arthritis to justify total knee replacement. He is elected to proceed with a cortisone injection. Consent for the injection was obtained. Risk of postinjection infection, lack of improvement, hypopigmentation and unusual allergic reaction were explained to the patient. After consent, the skin was sterilely prepped and 80 mg of Depo-Medrol and 5 cc of 0.25% plain Marcaine was was injected in the left knee. Patient had no complications. 25 cc of sterile fluid was drained from the knee. Patient is to ice modify activities for 24 hours.   Patient is to return to the office if no improvement        Lori May MD

## 2023-01-04 NOTE — LETTER
1/4/2023    Patient: Parviz Gilman. YOB: 1940   Date of Visit: 1/4/2023     Lynsey Lopez MD  [address]  [address] [facility]  Via Fax: [phone]    Dear Lynsey Lopez MD,      Thank you for referring Mr. Robyn Randolph to [facility] for evaluation. My notes for this consultation are attached. If you have questions, please do not hesitate to call me. I look forward to following your patient along with you.       Sincerely,    Ene Banerjee MD

## 2023-03-27 ENCOUNTER — OFFICE VISIT (OUTPATIENT)
Dept: ORTHOPEDIC SURGERY | Age: 83
End: 2023-03-27
Payer: MEDICARE

## 2023-03-27 DIAGNOSIS — M25.562 CHRONIC PAIN OF LEFT KNEE: Primary | ICD-10-CM

## 2023-03-27 DIAGNOSIS — M25.462 KNEE EFFUSION, LEFT: ICD-10-CM

## 2023-03-27 DIAGNOSIS — M17.12 PATELLOFEMORAL ARTHRITIS OF LEFT KNEE: ICD-10-CM

## 2023-03-27 DIAGNOSIS — G89.29 CHRONIC PAIN OF LEFT KNEE: Primary | ICD-10-CM

## 2023-03-27 PROCEDURE — G8427 DOCREV CUR MEDS BY ELIG CLIN: HCPCS | Performed by: ORTHOPAEDIC SURGERY

## 2023-03-27 PROCEDURE — G8432 DEP SCR NOT DOC, RNG: HCPCS | Performed by: ORTHOPAEDIC SURGERY

## 2023-03-27 PROCEDURE — 1123F ACP DISCUSS/DSCN MKR DOCD: CPT | Performed by: ORTHOPAEDIC SURGERY

## 2023-03-27 PROCEDURE — 1101F PT FALLS ASSESS-DOCD LE1/YR: CPT | Performed by: ORTHOPAEDIC SURGERY

## 2023-03-27 PROCEDURE — G8421 BMI NOT CALCULATED: HCPCS | Performed by: ORTHOPAEDIC SURGERY

## 2023-03-27 PROCEDURE — G8536 NO DOC ELDER MAL SCRN: HCPCS | Performed by: ORTHOPAEDIC SURGERY

## 2023-03-27 PROCEDURE — 20610 DRAIN/INJ JOINT/BURSA W/O US: CPT | Performed by: ORTHOPAEDIC SURGERY

## 2023-03-27 PROCEDURE — 99214 OFFICE O/P EST MOD 30 MIN: CPT | Performed by: ORTHOPAEDIC SURGERY

## 2023-03-27 RX ORDER — METHYLPREDNISOLONE ACETATE 80 MG/ML
80 INJECTION, SUSPENSION INTRA-ARTICULAR; INTRALESIONAL; INTRAMUSCULAR; SOFT TISSUE ONCE
Status: COMPLETED | OUTPATIENT
Start: 2023-03-27 | End: 2023-03-27

## 2023-03-27 RX ORDER — BUPIVACAINE HYDROCHLORIDE 2.5 MG/ML
5 INJECTION, SOLUTION INFILTRATION; PERINEURAL ONCE
Status: COMPLETED | OUTPATIENT
Start: 2023-03-27 | End: 2023-03-27

## 2023-03-27 RX ADMIN — METHYLPREDNISOLONE ACETATE 80 MG: 80 INJECTION, SUSPENSION INTRA-ARTICULAR; INTRALESIONAL; INTRAMUSCULAR; SOFT TISSUE at 12:55

## 2023-03-27 RX ADMIN — BUPIVACAINE HYDROCHLORIDE 12.5 MG: 2.5 INJECTION, SOLUTION INFILTRATION; PERINEURAL at 12:55

## 2023-03-27 NOTE — LETTER
3/27/2023    Patient: Kathy Phipps. YOB: 1940   Date of Visit: 3/27/2023     Pascual Crawley MD  [address]  [address] [facility]  Via Fax: [phone]    Dear Pascual Crawley MD,      Thank you for referring Mr. Jeronimo Thrasher to [facility] for evaluation. My notes for this consultation are attached. If you have questions, please do not hesitate to call me. I look forward to following your patient along with you.       Sincerely,    Lavinia Trimble MD

## 2023-03-27 NOTE — PROGRESS NOTES
Aixa Eng (: 1940) is a 81 y.o. male, patient, here for evaluation of the following chief complaint(s):  Knee Pain (Left knee )       HPI:    Patient returns to clinic today for follow-up evaluation and discussion of his left knee. He received corticosteroid injection in the left knee in January. Patient states he got a little bit of relief, but soon as he returned to inclined activities and navigating stairs his pain returned. Patient continues to endorse fairly anteriorly based knee pain he has no pain with flat ground ambulation but any sitting to standing motions and stairs are very problematic to him. He is here to discuss further options. No Known Allergies    Current Outpatient Medications   Medication Sig    cloNIDine HCL (CATAPRES) 0.1 mg tablet     gabapentin (NEURONTIN) 100 mg capsule Take 100 mg by mouth three (3) times daily. amLODIPine-benazepril (LotreL) 10-20 mg per capsule Take 1 Capsule by mouth daily. ibuprofen (MOTRIN) 200 mg tablet Take 2 Tabs by mouth every six (6) hours as needed for Pain.    vit Q-avbzumv-xbml-rutin-hb196 (BIOFLEX) 806-60-21-79 mg tab Take  by mouth daily. multivitamin (ONE A DAY) tablet Take 1 Tab by mouth daily. FLUoxetine (PROzac) 20 mg capsule Take 20 mg by mouth daily. pravastatin (PRAVACHOL) 10 mg tablet Take 40 mg by mouth daily. No current facility-administered medications for this visit.        Past Medical History:   Diagnosis Date    Arthralgia of left hand 10/23/2021    Bicipital tendinitis of left shoulder 10/23/2021    Brachial neuritis 10/23/2021    Cervicalgia 10/23/2021    Degeneration of cervical intervertebral disc 10/23/2021    Degenerative arthritis of left hand 10/23/2021    Degenerative arthritis of left knee 10/23/2021    Dupuytren's contracture 10/23/2021    Edema, lower extremity 10/23/2021    Hypercholesterolemia     Hypertension     Left knee pain 10/23/2021    Right hand pain 10/23/2021    S/P trigger finger release 10/23/2021    Skin cancer     BCC face, neck    Swelling of left knee joint 10/23/2021    Trigger little finger of right hand 10/23/2021        Past Surgical History:   Procedure Laterality Date    COLONOSCOPY N/A 2019    COLONOSCOPY performed by Anuja Portillo MD at [facility]  04/10/2017    [facility] right postauricular by Dr. Jessica Tineo Bilateral     hip replacement    HX ORTHOPAEDIC Left     rotator cuff repair       Family History   Problem Relation Age of Onset    Cancer Sister         bladder    Cancer Brother         colon cancer    Cancer Other         breast cancer        Social History     Socioeconomic History    Marital status:      Spouse name: Not on file    Number of children: Not on file    Years of education: Not on file    Highest education level: Not on file   Occupational History    Not on file   Tobacco Use    Smoking status: Former     Types: Cigarettes     Quit date: 1972     Years since quittin.8    Smokeless tobacco: Never   Substance and Sexual Activity    Alcohol use: Yes     Alcohol/week: 3.0 standard drinks     Types: 3 Glasses of wine per week    Drug use: Never    Sexual activity: Not on file   Other Topics Concern    Not on file   Social History Narrative    Not on file     Social Determinants of Health     Financial Resource Strain: Not on file   Food Insecurity: Not on file   Transportation Needs: Not on file   Physical Activity: Not on file   Stress: Not on file   Social Connections: Not on file   Intimate Partner Violence: Not on file   Housing Stability: Not on file       Review of Systems   Musculoskeletal:         Let knee      Vitals: There were no vitals taken for this visit. There is no height or weight on file to calculate BMI. Ortho Exam     Patient is alert and oriented x3. Patient is in no acute distress. Patient ambulates with a nonantalgic gait. Left knee: 2+ effusion is noted.   Patient has range of motion 0-130 degrees. There is mild pain with manipulation the patella. Positive crepitation with manipulation of the patella. Positive medial joint line tenderness is noted. Patient's pain is slightly worsened with Daniela's maneuver. There is no lateral joint tenderness. Collateral ligaments are intact. Anterior drawer and posterior drawer negative. Lachman's test negative. There is no soft tissue swelling distally. Neurovascular examination is intact. Right knee: There is no abrasions, lacerations, ecchymosis or soft tissue swelling. No effusion is identified. There is no pain to palpation along the medial or lateral border of the patella. There is no pain or crepitation with manipulation of the patella. There is normal excursion of the patella. Patellar grind test is negative. Active and passive range of motion is full and does not cause pain or crepitation. There is no pain with palpation along the medial femoral epicondyle or medial tibia and no pain with palpation over the lateral femoral epicondyle. There is no medial or lateral joint line tenderness. Daniela's maneuver is negative. There is no collateral ligament instability. Anterior drawer, Lachman and posterior drawer are negative. There is no soft tissue swelling distally into the leg. Neurocirculatory examination is intact. 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 His repeat radiographs today confirm that he has maintenance of his medial lateral joint space.   Patient has not been terribly responsive to corticosteroid therapy thus far. We did discuss the possibility of a viscosupplementation. The alternatives, benefits, risks of the gel injection were discussed. Patient expressed understanding and wished to proceed. Consent for the injection was obtained. Risk of postinjection infection, lack of improvement, hypopigmentation and unusual allergic reaction were explained to the patient. After consent, the skin was sterilely prepped and cord percent plain Marcaine was used to anesthetize our injection tract. An 18-gauge needle was introduced and   32 cc of sterile fluid was aspirated followed by the injection of the Gel One. Patient had no complications. Patient is to ice modify activities for 24 hours.   Patient is to return to the office if no improvement        Lacey Aranda MD

## 2024-01-11 ENCOUNTER — OFFICE VISIT (OUTPATIENT)
Age: 84
End: 2024-01-11

## 2024-01-11 VITALS
RESPIRATION RATE: 16 BRPM | BODY MASS INDEX: 28.63 KG/M2 | WEIGHT: 200 LBS | HEIGHT: 70 IN | TEMPERATURE: 98.3 F | OXYGEN SATURATION: 96 % | SYSTOLIC BLOOD PRESSURE: 119 MMHG | HEART RATE: 74 BPM | DIASTOLIC BLOOD PRESSURE: 66 MMHG

## 2024-01-11 DIAGNOSIS — Z87.09 HISTORY OF BRONCHITIS: ICD-10-CM

## 2024-01-11 DIAGNOSIS — U07.1 COVID-19: Primary | ICD-10-CM

## 2024-01-11 LAB
INFLUENZA A ANTIGEN, POC: NEGATIVE
INFLUENZA B ANTIGEN, POC: NEGATIVE
Lab: ABNORMAL
PERFORMING INSTRUMENT: ABNORMAL
QC PASS/FAIL: ABNORMAL
SARS-COV-2, POC: DETECTED
VALID INTERNAL CONTROL, POC: YES

## 2024-01-11 RX ORDER — ALBUTEROL SULFATE 90 UG/1
2 AEROSOL, METERED RESPIRATORY (INHALATION) 4 TIMES DAILY PRN
Qty: 18 G | Refills: 0 | Status: SHIPPED | OUTPATIENT
Start: 2024-01-11

## 2024-01-11 NOTE — PATIENT INSTRUCTIONS
Patient Education        Learning About Coronavirus (COVID-19)  What is COVID-19?     COVID-19 is a disease caused by a type of coronavirus. This illness was first found in 2019 and has since spread worldwide (pandemic). Symptoms can range from mild, such as fever and body aches, to severe, including trouble breathing. COVID-19 can be deadly.  Coronaviruses are a large group of viruses. Some types cause the common cold. Others cause more serious illnesses like Middle East respiratory syndrome (MERS) and severe acute respiratory syndrome (SARS).  What are the symptoms?  COVID-19 symptoms may include:  Fever.  Cough.  Trouble breathing.  Chills or repeated shaking with chills.  Muscle and body aches.  Headache.  Sore throat.  New loss of taste or smell.  Vomiting.  Diarrhea.  Stuffy or runny nose.  In severe cases, COVID-19 can cause pneumonia and make it hard to breathe without help from a machine. It can cause death.  How is it diagnosed?  COVID-19 is diagnosed with a viral test. This may also be called a PCR test or antigen test. It looks for evidence of the virus in your breathing passages or lungs (respiratory system).  The test is most often done on a sample from the nose, throat, or lungs. It's sometimes done on a sample of saliva. One way a sample is collected is by rubbing the swab in a [place] in each nostril.  If you have questions about testing, ask your doctor or check the CDC website at cdc.gov for information.  How is it treated?  A mild case of COVID-19 can usually be treated at home. Over-the-counter medicine like acetaminophen (Tylenol) or ibuprofen (Advil) can help relieve your symptoms.  But even if your symptoms are mild, talk to your doctor right away. Medicines, such as antivirals, can help reduce the risk of serious illness. But you need to take them within a few days after symptoms start. There may be other options if antivirals aren't a good choice for you.  If you do get very sick, you will

## 2024-01-11 NOTE — PROGRESS NOTES
Demian Alberto Jr. (:  1940) is a 83 y.o. male,New patient, here for evaluation of the following chief complaint(s):  Sinusitis (On and off since December, worsened last night. Pt treated after eduarda with azithromycin.)      ASSESSMENT/PLAN:  Visit Diagnoses and Associated Orders       COVID-19    -  Primary    AMB POC INFLUENZA A  AND B REAL-TIME RT-PCR [52792 CPT(R)]      POCT COVID-19, Antigen [GSZ287 Custom]      albuterol sulfate HFA (VENTOLIN HFA) 108 (90 Base) MCG/ACT inhaler [06788]           History of bronchitis        albuterol sulfate HFA (VENTOLIN HFA) 108 (90 Base) MCG/ACT inhaler [71559]                 The patient was counseled for a POSITIVE test result for COVID-19. The following information was given to the patient:    The COVID-19 test result was positive.  Mild and stable symptoms are managed at home.  Day 0 is your first day of symptoms or a positive test.  Day 1 is the first day after your symptoms started or your test specimen was collected.    If you have COVID-19 or have symptoms  Stay home for at least 5 days and isolate from others in your home.  Wear a well-fitted mask if you must be around others in your home.  End isolation after 5 full days if you are fever-free for 24 hours (without the use of fever-reducing medication) and your symptoms are improving.    If you did NOT have symptoms  End isolation after at least 5 full days after your positive test.  Wear a well-fitted mask for 10 full days any time you are around others inside your home or in public. Do not go to places where you are unable to wear a mask.  If you were severely ill with COVID-19, you should isolate for at least 10 days. Consult your doctor before ending isolation.  Contact your medical provider if symptoms are worsening, such as difficulty breathing.    To prevent spreading COVID  Avoid travel and avoid being around people who are at high risk  Wash hands often with soap and water for at least 20

## 2025-01-16 ENCOUNTER — HOSPITAL ENCOUNTER (OUTPATIENT)
Facility: HOSPITAL | Age: 85
Discharge: HOME | End: 2025-01-19
Payer: MEDICARE

## 2025-01-16 VITALS
DIASTOLIC BLOOD PRESSURE: 73 MMHG | SYSTOLIC BLOOD PRESSURE: 135 MMHG | WEIGHT: 206.79 LBS | TEMPERATURE: 97.6 F | BODY MASS INDEX: 29.6 KG/M2 | HEIGHT: 70 IN | HEART RATE: 72 BPM

## 2025-01-16 LAB
BASOPHILS # BLD: 0.02 K/UL (ref 0–0.1)
BASOPHILS NFR BLD: 0.2 % (ref 0–1)
DIFFERENTIAL METHOD BLD: NORMAL
EKG ATRIAL RATE: 66 BPM
EKG DIAGNOSIS: NORMAL
EKG P AXIS: -12 DEGREES
EKG P-R INTERVAL: 154 MS
EKG Q-T INTERVAL: 418 MS
EKG QRS DURATION: 88 MS
EKG QTC CALCULATION (BAZETT): 438 MS
EKG R AXIS: -2 DEGREES
EKG T AXIS: 38 DEGREES
EKG VENTRICULAR RATE: 66 BPM
EOSINOPHIL # BLD: 0.21 K/UL (ref 0–0.4)
EOSINOPHIL NFR BLD: 2.6 % (ref 0–7)
ERYTHROCYTE [DISTWIDTH] IN BLOOD BY AUTOMATED COUNT: 13.2 % (ref 11.5–14.5)
HCT VFR BLD AUTO: 40.4 % (ref 36.6–50.3)
HGB BLD-MCNC: 13.1 G/DL (ref 12.1–17)
IMM GRANULOCYTES # BLD AUTO: 0.02 K/UL (ref 0–0.04)
IMM GRANULOCYTES NFR BLD AUTO: 0.2 % (ref 0–0.5)
LYMPHOCYTES # BLD: 1.57 K/UL (ref 0.8–3.5)
LYMPHOCYTES NFR BLD: 19.5 % (ref 12–49)
MCH RBC QN AUTO: 31 PG (ref 26–34)
MCHC RBC AUTO-ENTMCNC: 32.4 G/DL (ref 30–36.5)
MCV RBC AUTO: 95.5 FL (ref 80–99)
MONOCYTES # BLD: 0.72 K/UL (ref 0–1)
MONOCYTES NFR BLD: 8.9 % (ref 5–13)
NEUTS SEG # BLD: 5.51 K/UL (ref 1.8–8)
NEUTS SEG NFR BLD: 68.6 % (ref 32–75)
NRBC # BLD: 0 K/UL (ref 0–0.01)
NRBC BLD-RTO: 0 PER 100 WBC
PLATELET # BLD AUTO: 267 K/UL (ref 150–400)
PMV BLD AUTO: 10.7 FL (ref 8.9–12.9)
RBC # BLD AUTO: 4.23 M/UL (ref 4.1–5.7)
WBC # BLD AUTO: 8.1 K/UL (ref 4.1–11.1)

## 2025-01-16 PROCEDURE — 93005 ELECTROCARDIOGRAM TRACING: CPT | Performed by: NEUROLOGICAL SURGERY

## 2025-01-16 PROCEDURE — 85025 COMPLETE CBC W/AUTO DIFF WBC: CPT

## 2025-01-16 PROCEDURE — 36415 COLL VENOUS BLD VENIPUNCTURE: CPT

## 2025-01-16 RX ORDER — TAMSULOSIN HYDROCHLORIDE 0.4 MG/1
0.4 CAPSULE ORAL NIGHTLY
COMMUNITY

## 2025-01-16 ASSESSMENT — PAIN DESCRIPTION - LOCATION: LOCATION: NECK

## 2025-01-16 ASSESSMENT — PAIN SCALES - GENERAL: PAINLEVEL_OUTOF10: 4

## 2025-01-16 ASSESSMENT — PAIN DESCRIPTION - DESCRIPTORS: DESCRIPTORS: BURNING

## 2025-01-16 NOTE — PERIOP NOTE
[address] [address]   MAIN OR                     [phone]    MAIN PRE OP             [phone]                                                                                AMBULATORY PRE OP          [phone]  PRE-ADMISSION TESTING    [phone]     Surgery Date:  01/30/2025       [facility] staff will call you between 4 and 7pm the day before your surgery with your arrival time. (If your surgery is on a Monday, we will call you the Friday before.)    Call [phone] after 7pm Monday-Friday if you did not receive this call.    INSTRUCTIONS BEFORE YOUR SURGERY   When You  Arrive Arrive at [facility] Patient Access on 1st floor the day of your surgery.  Have your insurance card, photo ID,living will/advanced directive/POA (if applicable),  and any copayment (if needed)   Food   and   Drink NO solid food after midnight the night before surgery. You can drink clear liquids from midnight until ONE hour prior to your arrival at the hospital on the day of your surgery. Clear liquids include:  Water  Fruit juices without pulp (NO ORANGE JUICE)  Carbonated beverages  Black coffee(no cream/milk)  Tea(no cream/milk)  Gatorade    No alcohol (beer, wine, liquor) or marijuana (smoking) 24 hours, edibles (3 days). Stop smoking cigarettes 14 days before surgery (helps w/healing and breathing).   Medications to   TAKE   Morning of Surgery MEDICATIONS TO TAKE THE MORNING OF SURGERY WITH A SIP OF WATER: GABAPENTIN, FLUOXETINE    You may take these medications, IF NEEDED, the morning of surgery: ALBUTEROL     Medications to STOP  before surgery Non-Steroidal anti-inflammatory Drugs (NSAID's): for example, Diclofenac(Voltaren),  Ibuprofen (Advil, Motrin), Naproxen (Aleve) 3 days    STOP all herbal supplements and vitamins(unless prescribed by your doctor), and fish oil for 7 days    (Pain medications not listed above, including Tylenol may be taken

## 2025-01-17 LAB
BACTERIA SPEC CULT: NORMAL
BACTERIA SPEC CULT: NORMAL
SERVICE CMNT-IMP: NORMAL

## 2025-01-20 NOTE — PERIOP NOTE
Trigger little finger of right hand 10/23/2021      ____________________________________________  PAST SURGICAL HISTORY  Past Surgical History:   Procedure Laterality Date    COLONOSCOPY N/A 2019    COLONOSCOPY performed by Nathan Person MD at [facility] ENDOSCOPY    FINGER SURGERY Left     DEPUYTREN'S CONTRACTURE SURGERY X3    KNEE CARTILAGE SURGERY Left 2023    MOHS SURGERY      X5    PROSTATE BIOPSY      ROTATOR CUFF REPAIR Left     TOTAL HIP ARTHROPLASTY Left 2013    TOTAL HIP ARTHROPLASTY Right 2013     ____________________________________________  HOME MEDICATIONS    Current Outpatient Medications   Medication Sig    tamsulosin (FLOMAX) 0.4 MG capsule Take 1 capsule by mouth nightly    Multiple Vitamins-Minerals (CENTRUM SILVER PO) Take 1 tablet by mouth nightly    Boswellia-Glucosamine-Vit D (OSTEO BI-FLEX ONE PER DAY PO) Take 1 tablet by mouth every morning    albuterol sulfate HFA (VENTOLIN HFA) 108 (90 Base) MCG/ACT inhaler Inhale 2 puffs into the lungs 4 times daily as needed for Wheezing    amLODIPine-benazepril (LOTREL) 10-20 MG per capsule Take 1 capsule by mouth every morning    FLUoxetine (PROZAC) 20 MG capsule Take 1 capsule by mouth every morning    gabapentin (NEURONTIN) 100 MG capsule Take 1 capsule by mouth 3 times daily.    ibuprofen (ADVIL;MOTRIN) 200 MG tablet Take 2 tablets by mouth every 6 hours as needed    pravastatin (PRAVACHOL) 40 MG tablet Take 1 tablet by mouth nightly    amoxicillin (AMOXIL) 500 MG tablet Take 2 tablets 1 hour before dental procedure and 2 tablets 1 hour after     No current facility-administered medications for this encounter.      ____________________________________________  ALLERGIES  No Known Allergies   ____________________________________________  SOCIAL HISTORY  Social History     Tobacco Use    Smoking status: Former     Current packs/day: 0.00     Types: Cigarettes     Quit date: 1972     Years since quittin.6    Smokeless tobacco:

## 2025-01-30 ENCOUNTER — ANESTHESIA (OUTPATIENT)
Facility: HOSPITAL | Age: 85
End: 2025-01-30
Payer: MEDICARE

## 2025-01-30 ENCOUNTER — APPOINTMENT (OUTPATIENT)
Facility: HOSPITAL | Age: 85
DRG: 472 | End: 2025-01-30
Attending: NEUROLOGICAL SURGERY
Payer: MEDICARE

## 2025-01-30 ENCOUNTER — HOSPITAL ENCOUNTER (INPATIENT)
Facility: HOSPITAL | Age: 85
LOS: 1 days | Discharge: HOME HEALTH | DRG: 472 | End: 2025-01-31
Attending: NEUROLOGICAL SURGERY | Admitting: NEUROLOGICAL SURGERY
Payer: MEDICARE

## 2025-01-30 ENCOUNTER — ANESTHESIA EVENT (OUTPATIENT)
Facility: HOSPITAL | Age: 85
End: 2025-01-30
Payer: MEDICARE

## 2025-01-30 LAB
GLUCOSE BLD STRIP.AUTO-MCNC: 101 MG/DL (ref 65–117)
SERVICE CMNT-IMP: NORMAL

## 2025-01-30 PROCEDURE — 2500000003 HC RX 250 WO HCPCS: Performed by: NEUROLOGICAL SURGERY

## 2025-01-30 PROCEDURE — 2580000003 HC RX 258: Performed by: ANESTHESIOLOGY

## 2025-01-30 PROCEDURE — 2500000003 HC RX 250 WO HCPCS

## 2025-01-30 PROCEDURE — 6360000002 HC RX W HCPCS: Performed by: NEUROLOGICAL SURGERY

## 2025-01-30 PROCEDURE — C1729 CATH, DRAINAGE: HCPCS | Performed by: NEUROLOGICAL SURGERY

## 2025-01-30 PROCEDURE — 99024 POSTOP FOLLOW-UP VISIT: CPT | Performed by: PHYSICIAN ASSISTANT

## 2025-01-30 PROCEDURE — 3600000004 HC SURGERY LEVEL 4 BASE: Performed by: NEUROLOGICAL SURGERY

## 2025-01-30 PROCEDURE — 0RT30ZZ RESECTION OF CERVICAL VERTEBRAL DISC, OPEN APPROACH: ICD-10-PCS | Performed by: NEUROLOGICAL SURGERY

## 2025-01-30 PROCEDURE — 7100000000 HC PACU RECOVERY - FIRST 15 MIN: Performed by: NEUROLOGICAL SURGERY

## 2025-01-30 PROCEDURE — 6370000000 HC RX 637 (ALT 250 FOR IP): Performed by: ANESTHESIOLOGY

## 2025-01-30 PROCEDURE — 0RG20A0 FUSION OF 2 OR MORE CERVICAL VERTEBRAL JOINTS WITH INTERBODY FUSION DEVICE, ANTERIOR APPROACH, ANTERIOR COLUMN, OPEN APPROACH: ICD-10-PCS | Performed by: NEUROLOGICAL SURGERY

## 2025-01-30 PROCEDURE — 6370000000 HC RX 637 (ALT 250 FOR IP): Performed by: PHYSICIAN ASSISTANT

## 2025-01-30 PROCEDURE — L0180 CER POST COL OCC/MAN SUP ADJ: HCPCS | Performed by: NEUROLOGICAL SURGERY

## 2025-01-30 PROCEDURE — 6370000000 HC RX 637 (ALT 250 FOR IP): Performed by: NEUROLOGICAL SURGERY

## 2025-01-30 PROCEDURE — 2709999900 HC NON-CHARGEABLE SUPPLY: Performed by: NEUROLOGICAL SURGERY

## 2025-01-30 PROCEDURE — 3700000001 HC ADD 15 MINUTES (ANESTHESIA): Performed by: NEUROLOGICAL SURGERY

## 2025-01-30 PROCEDURE — 00NW0ZZ RELEASE CERVICAL SPINAL CORD, OPEN APPROACH: ICD-10-PCS | Performed by: NEUROLOGICAL SURGERY

## 2025-01-30 PROCEDURE — 2580000003 HC RX 258

## 2025-01-30 PROCEDURE — 6360000002 HC RX W HCPCS: Performed by: ANESTHESIOLOGY

## 2025-01-30 PROCEDURE — 1200000000 HC SEMI PRIVATE

## 2025-01-30 PROCEDURE — C1713 ANCHOR/SCREW BN/BN,TIS/BN: HCPCS | Performed by: NEUROLOGICAL SURGERY

## 2025-01-30 PROCEDURE — 2720000010 HC SURG SUPPLY STERILE: Performed by: NEUROLOGICAL SURGERY

## 2025-01-30 PROCEDURE — 6360000002 HC RX W HCPCS

## 2025-01-30 PROCEDURE — 3600000014 HC SURGERY LEVEL 4 ADDTL 15MIN: Performed by: NEUROLOGICAL SURGERY

## 2025-01-30 PROCEDURE — 3700000000 HC ANESTHESIA ATTENDED CARE: Performed by: NEUROLOGICAL SURGERY

## 2025-01-30 PROCEDURE — C1889 IMPLANT/INSERT DEVICE, NOC: HCPCS | Performed by: NEUROLOGICAL SURGERY

## 2025-01-30 PROCEDURE — 82962 GLUCOSE BLOOD TEST: CPT

## 2025-01-30 PROCEDURE — 7100000001 HC PACU RECOVERY - ADDTL 15 MIN: Performed by: NEUROLOGICAL SURGERY

## 2025-01-30 DEVICE — SCREW SPNL L16MM DIA4MM ANT CERV TI SELF DRL FULL THRD: Type: IMPLANTABLE DEVICE | Site: SPINE CERVICAL | Status: FUNCTIONAL

## 2025-01-30 DEVICE — GRAFT BNE SUB M 5ML CANC DBM FRMBL CELLULAR VIVIGEN: Type: IMPLANTABLE DEVICE | Site: SPINE CERVICAL | Status: FUNCTIONAL

## 2025-01-30 DEVICE — IMPLANTABLE DEVICE
Type: IMPLANTABLE DEVICE | Site: SPINE CERVICAL | Status: FUNCTIONAL
Brand: ACIS® PROTI 360°™

## 2025-01-30 DEVICE — SCREW SPNL L16MM DIA4MM ANT CERV TI SELF DRL VAR ANG FULL: Type: IMPLANTABLE DEVICE | Site: SPINE CERVICAL | Status: FUNCTIONAL

## 2025-01-30 DEVICE — GRAFT BNE MED: Type: IMPLANTABLE DEVICE | Site: SPINE CERVICAL | Status: FUNCTIONAL

## 2025-01-30 DEVICE — IMPLANTABLE DEVICE: Type: IMPLANTABLE DEVICE | Site: SPINE CERVICAL | Status: FUNCTIONAL

## 2025-01-30 RX ORDER — SODIUM CHLORIDE 0.9 % (FLUSH) 0.9 %
5-40 SYRINGE (ML) INJECTION PRN
Status: DISCONTINUED | OUTPATIENT
Start: 2025-01-30 | End: 2025-01-31 | Stop reason: HOSPADM

## 2025-01-30 RX ORDER — MIDAZOLAM HYDROCHLORIDE 2 MG/2ML
2 INJECTION, SOLUTION INTRAMUSCULAR; INTRAVENOUS PRN
Status: DISCONTINUED | OUTPATIENT
Start: 2025-01-30 | End: 2025-01-30 | Stop reason: HOSPADM

## 2025-01-30 RX ORDER — MAGNESIUM HYDROXIDE/ALUMINUM HYDROXICE/SIMETHICONE 120; 1200; 1200 MG/30ML; MG/30ML; MG/30ML
15 SUSPENSION ORAL EVERY 6 HOURS PRN
Status: DISCONTINUED | OUTPATIENT
Start: 2025-01-30 | End: 2025-01-31 | Stop reason: HOSPADM

## 2025-01-30 RX ORDER — GABAPENTIN 100 MG/1
100 CAPSULE ORAL 3 TIMES DAILY
Status: DISCONTINUED | OUTPATIENT
Start: 2025-01-30 | End: 2025-01-30

## 2025-01-30 RX ORDER — SODIUM CHLORIDE 0.9 % (FLUSH) 0.9 %
5-40 SYRINGE (ML) INJECTION PRN
Status: DISCONTINUED | OUTPATIENT
Start: 2025-01-30 | End: 2025-01-30 | Stop reason: HOSPADM

## 2025-01-30 RX ORDER — ACETAMINOPHEN 500 MG
1000 TABLET ORAL ONCE
Status: COMPLETED | OUTPATIENT
Start: 2025-01-30 | End: 2025-01-30

## 2025-01-30 RX ORDER — SPIRONOLACTONE 25 MG/1
25 TABLET ORAL DAILY
Status: DISCONTINUED | OUTPATIENT
Start: 2025-01-30 | End: 2025-01-31 | Stop reason: HOSPADM

## 2025-01-30 RX ORDER — GABAPENTIN 100 MG/1
200 CAPSULE ORAL 3 TIMES DAILY
Status: DISCONTINUED | OUTPATIENT
Start: 2025-01-30 | End: 2025-01-31 | Stop reason: HOSPADM

## 2025-01-30 RX ORDER — ROCURONIUM BROMIDE 10 MG/ML
INJECTION, SOLUTION INTRAVENOUS
Status: DISCONTINUED | OUTPATIENT
Start: 2025-01-30 | End: 2025-01-30 | Stop reason: SDUPTHER

## 2025-01-30 RX ORDER — SODIUM CHLORIDE, SODIUM LACTATE, POTASSIUM CHLORIDE, CALCIUM CHLORIDE 600; 310; 30; 20 MG/100ML; MG/100ML; MG/100ML; MG/100ML
INJECTION, SOLUTION INTRAVENOUS
Status: DISCONTINUED | OUTPATIENT
Start: 2025-01-30 | End: 2025-01-30 | Stop reason: SDUPTHER

## 2025-01-30 RX ORDER — PROPOFOL 10 MG/ML
INJECTION, EMULSION INTRAVENOUS
Status: DISCONTINUED | OUTPATIENT
Start: 2025-01-30 | End: 2025-01-30 | Stop reason: SDUPTHER

## 2025-01-30 RX ORDER — GLYCOPYRROLATE 0.2 MG/ML
INJECTION INTRAMUSCULAR; INTRAVENOUS
Status: DISCONTINUED | OUTPATIENT
Start: 2025-01-30 | End: 2025-01-30 | Stop reason: SDUPTHER

## 2025-01-30 RX ORDER — HYDRALAZINE HYDROCHLORIDE 20 MG/ML
10 INJECTION INTRAMUSCULAR; INTRAVENOUS
Status: DISCONTINUED | OUTPATIENT
Start: 2025-01-30 | End: 2025-01-30 | Stop reason: HOSPADM

## 2025-01-30 RX ORDER — FENTANYL CITRATE 50 UG/ML
25 INJECTION, SOLUTION INTRAMUSCULAR; INTRAVENOUS EVERY 5 MIN PRN
Status: DISCONTINUED | OUTPATIENT
Start: 2025-01-30 | End: 2025-01-30 | Stop reason: HOSPADM

## 2025-01-30 RX ORDER — EPHEDRINE SULFATE 50 MG/ML
INJECTION INTRAVENOUS
Status: DISCONTINUED | OUTPATIENT
Start: 2025-01-30 | End: 2025-01-30 | Stop reason: SDUPTHER

## 2025-01-30 RX ORDER — ALBUTEROL SULFATE 0.83 MG/ML
2.5 SOLUTION RESPIRATORY (INHALATION) 4 TIMES DAILY PRN
Status: DISCONTINUED | OUTPATIENT
Start: 2025-01-30 | End: 2025-01-31 | Stop reason: HOSPADM

## 2025-01-30 RX ORDER — DEXMEDETOMIDINE HYDROCHLORIDE 100 UG/ML
INJECTION, SOLUTION INTRAVENOUS
Status: DISCONTINUED | OUTPATIENT
Start: 2025-01-30 | End: 2025-01-30 | Stop reason: SDUPTHER

## 2025-01-30 RX ORDER — TAMSULOSIN HYDROCHLORIDE 0.4 MG/1
0.4 CAPSULE ORAL NIGHTLY
Status: DISCONTINUED | OUTPATIENT
Start: 2025-01-30 | End: 2025-01-31 | Stop reason: HOSPADM

## 2025-01-30 RX ORDER — DIPHENHYDRAMINE HCL 25 MG
25 CAPSULE ORAL EVERY 6 HOURS PRN
Status: DISCONTINUED | OUTPATIENT
Start: 2025-01-30 | End: 2025-01-31 | Stop reason: HOSPADM

## 2025-01-30 RX ORDER — PROCHLORPERAZINE EDISYLATE 5 MG/ML
5 INJECTION INTRAMUSCULAR; INTRAVENOUS
Status: DISCONTINUED | OUTPATIENT
Start: 2025-01-30 | End: 2025-01-30 | Stop reason: HOSPADM

## 2025-01-30 RX ORDER — SODIUM CHLORIDE 0.9 % (FLUSH) 0.9 %
5-40 SYRINGE (ML) INJECTION EVERY 12 HOURS SCHEDULED
Status: DISCONTINUED | OUTPATIENT
Start: 2025-01-30 | End: 2025-01-30 | Stop reason: HOSPADM

## 2025-01-30 RX ORDER — SENNA AND DOCUSATE SODIUM 50; 8.6 MG/1; MG/1
1 TABLET, FILM COATED ORAL 2 TIMES DAILY
Status: DISCONTINUED | OUTPATIENT
Start: 2025-01-30 | End: 2025-01-31 | Stop reason: HOSPADM

## 2025-01-30 RX ORDER — NALOXONE HYDROCHLORIDE 0.4 MG/ML
INJECTION, SOLUTION INTRAMUSCULAR; INTRAVENOUS; SUBCUTANEOUS PRN
Status: DISCONTINUED | OUTPATIENT
Start: 2025-01-30 | End: 2025-01-30 | Stop reason: HOSPADM

## 2025-01-30 RX ORDER — SODIUM CHLORIDE, SODIUM LACTATE, POTASSIUM CHLORIDE, CALCIUM CHLORIDE 600; 310; 30; 20 MG/100ML; MG/100ML; MG/100ML; MG/100ML
INJECTION, SOLUTION INTRAVENOUS CONTINUOUS
Status: DISCONTINUED | OUTPATIENT
Start: 2025-01-30 | End: 2025-01-30 | Stop reason: HOSPADM

## 2025-01-30 RX ORDER — CYCLOBENZAPRINE HCL 10 MG
10 TABLET ORAL EVERY 12 HOURS PRN
Status: DISCONTINUED | OUTPATIENT
Start: 2025-01-30 | End: 2025-01-31 | Stop reason: HOSPADM

## 2025-01-30 RX ORDER — SODIUM CHLORIDE 0.9 % (FLUSH) 0.9 %
5-40 SYRINGE (ML) INJECTION EVERY 12 HOURS SCHEDULED
Status: DISCONTINUED | OUTPATIENT
Start: 2025-01-30 | End: 2025-01-31 | Stop reason: HOSPADM

## 2025-01-30 RX ORDER — LIDOCAINE HYDROCHLORIDE 20 MG/ML
INJECTION, SOLUTION EPIDURAL; INFILTRATION; INTRACAUDAL; PERINEURAL
Status: DISCONTINUED | OUTPATIENT
Start: 2025-01-30 | End: 2025-01-30 | Stop reason: SDUPTHER

## 2025-01-30 RX ORDER — ALBUTEROL SULFATE 90 UG/1
2 INHALANT RESPIRATORY (INHALATION) 4 TIMES DAILY PRN
Status: DISCONTINUED | OUTPATIENT
Start: 2025-01-30 | End: 2025-01-30 | Stop reason: CLARIF

## 2025-01-30 RX ORDER — DEXAMETHASONE SODIUM PHOSPHATE 4 MG/ML
INJECTION, SOLUTION INTRA-ARTICULAR; INTRALESIONAL; INTRAMUSCULAR; INTRAVENOUS; SOFT TISSUE
Status: DISCONTINUED | OUTPATIENT
Start: 2025-01-30 | End: 2025-01-30 | Stop reason: SDUPTHER

## 2025-01-30 RX ORDER — SODIUM CHLORIDE 9 MG/ML
INJECTION, SOLUTION INTRAVENOUS PRN
Status: DISCONTINUED | OUTPATIENT
Start: 2025-01-30 | End: 2025-01-31 | Stop reason: HOSPADM

## 2025-01-30 RX ORDER — FENTANYL CITRATE 50 UG/ML
100 INJECTION, SOLUTION INTRAMUSCULAR; INTRAVENOUS
Status: DISCONTINUED | OUTPATIENT
Start: 2025-01-30 | End: 2025-01-30 | Stop reason: HOSPADM

## 2025-01-30 RX ORDER — SODIUM CHLORIDE AND POTASSIUM CHLORIDE 150; 900 MG/100ML; MG/100ML
INJECTION, SOLUTION INTRAVENOUS CONTINUOUS
Status: DISCONTINUED | OUTPATIENT
Start: 2025-01-30 | End: 2025-01-31 | Stop reason: HOSPADM

## 2025-01-30 RX ORDER — DIPHENHYDRAMINE HYDROCHLORIDE 50 MG/ML
25 INJECTION INTRAMUSCULAR; INTRAVENOUS EVERY 6 HOURS PRN
Status: DISCONTINUED | OUTPATIENT
Start: 2025-01-30 | End: 2025-01-31 | Stop reason: HOSPADM

## 2025-01-30 RX ORDER — ONDANSETRON 2 MG/ML
4 INJECTION INTRAMUSCULAR; INTRAVENOUS
Status: DISCONTINUED | OUTPATIENT
Start: 2025-01-30 | End: 2025-01-30 | Stop reason: HOSPADM

## 2025-01-30 RX ORDER — OXYCODONE HYDROCHLORIDE 5 MG/1
5 TABLET ORAL EVERY 4 HOURS PRN
Status: DISCONTINUED | OUTPATIENT
Start: 2025-01-30 | End: 2025-01-31 | Stop reason: HOSPADM

## 2025-01-30 RX ORDER — PHENYLEPHRINE HYDROCHLORIDE 10 MG/ML
INJECTION INTRAVENOUS
Status: DISCONTINUED | OUTPATIENT
Start: 2025-01-30 | End: 2025-01-30 | Stop reason: SDUPTHER

## 2025-01-30 RX ORDER — HYDROMORPHONE HYDROCHLORIDE 1 MG/ML
0.5 INJECTION, SOLUTION INTRAMUSCULAR; INTRAVENOUS; SUBCUTANEOUS EVERY 5 MIN PRN
Status: DISCONTINUED | OUTPATIENT
Start: 2025-01-30 | End: 2025-01-30 | Stop reason: HOSPADM

## 2025-01-30 RX ORDER — DEXAMETHASONE SODIUM PHOSPHATE 4 MG/ML
4 INJECTION, SOLUTION INTRA-ARTICULAR; INTRALESIONAL; INTRAMUSCULAR; INTRAVENOUS; SOFT TISSUE EVERY 8 HOURS
Status: COMPLETED | OUTPATIENT
Start: 2025-01-30 | End: 2025-01-31

## 2025-01-30 RX ORDER — SUCCINYLCHOLINE/SOD CL,ISO/PF 200MG/10ML
SYRINGE (ML) INTRAVENOUS
Status: DISCONTINUED | OUTPATIENT
Start: 2025-01-30 | End: 2025-01-30 | Stop reason: SDUPTHER

## 2025-01-30 RX ORDER — FENTANYL CITRATE 50 UG/ML
INJECTION, SOLUTION INTRAMUSCULAR; INTRAVENOUS
Status: DISCONTINUED | OUTPATIENT
Start: 2025-01-30 | End: 2025-01-30 | Stop reason: SDUPTHER

## 2025-01-30 RX ORDER — ACETAMINOPHEN 325 MG/1
650 TABLET ORAL EVERY 6 HOURS
Status: DISCONTINUED | OUTPATIENT
Start: 2025-01-30 | End: 2025-01-31 | Stop reason: HOSPADM

## 2025-01-30 RX ORDER — SODIUM CHLORIDE 9 MG/ML
INJECTION, SOLUTION INTRAVENOUS PRN
Status: DISCONTINUED | OUTPATIENT
Start: 2025-01-30 | End: 2025-01-30 | Stop reason: HOSPADM

## 2025-01-30 RX ORDER — SPIRONOLACTONE 25 MG/1
25 TABLET ORAL DAILY
COMMUNITY

## 2025-01-30 RX ORDER — LIDOCAINE HYDROCHLORIDE 10 MG/ML
1 INJECTION, SOLUTION EPIDURAL; INFILTRATION; INTRACAUDAL; PERINEURAL
Status: DISCONTINUED | OUTPATIENT
Start: 2025-01-30 | End: 2025-01-30 | Stop reason: HOSPADM

## 2025-01-30 RX ORDER — ONDANSETRON 2 MG/ML
4 INJECTION INTRAMUSCULAR; INTRAVENOUS EVERY 6 HOURS PRN
Status: DISCONTINUED | OUTPATIENT
Start: 2025-01-30 | End: 2025-01-31 | Stop reason: HOSPADM

## 2025-01-30 RX ORDER — OXYCODONE HYDROCHLORIDE 5 MG/1
5 TABLET ORAL
Status: DISCONTINUED | OUTPATIENT
Start: 2025-01-30 | End: 2025-01-30 | Stop reason: HOSPADM

## 2025-01-30 RX ORDER — PRAVASTATIN SODIUM 40 MG
40 TABLET ORAL NIGHTLY
Status: DISCONTINUED | OUTPATIENT
Start: 2025-01-30 | End: 2025-01-31 | Stop reason: HOSPADM

## 2025-01-30 RX ORDER — OXYCODONE HYDROCHLORIDE 5 MG/1
10 TABLET ORAL EVERY 4 HOURS PRN
Status: DISCONTINUED | OUTPATIENT
Start: 2025-01-30 | End: 2025-01-31 | Stop reason: HOSPADM

## 2025-01-30 RX ORDER — ONDANSETRON 2 MG/ML
INJECTION INTRAMUSCULAR; INTRAVENOUS
Status: DISCONTINUED | OUTPATIENT
Start: 2025-01-30 | End: 2025-01-30 | Stop reason: SDUPTHER

## 2025-01-30 RX ORDER — ONDANSETRON 4 MG/1
4 TABLET, ORALLY DISINTEGRATING ORAL EVERY 8 HOURS PRN
Status: DISCONTINUED | OUTPATIENT
Start: 2025-01-30 | End: 2025-01-31 | Stop reason: HOSPADM

## 2025-01-30 RX ADMIN — PROPOFOL 50 MG: 10 INJECTION, EMULSION INTRAVENOUS at 09:51

## 2025-01-30 RX ADMIN — LIDOCAINE HYDROCHLORIDE 100 MG: 20 INJECTION, SOLUTION EPIDURAL; INFILTRATION; INTRACAUDAL; PERINEURAL at 07:34

## 2025-01-30 RX ADMIN — ACETAMINOPHEN 650 MG: 325 TABLET ORAL at 19:04

## 2025-01-30 RX ADMIN — HYDROMORPHONE HYDROCHLORIDE 0.25 MG: 1 INJECTION, SOLUTION INTRAMUSCULAR; INTRAVENOUS; SUBCUTANEOUS at 08:42

## 2025-01-30 RX ADMIN — GABAPENTIN 200 MG: 100 CAPSULE ORAL at 19:53

## 2025-01-30 RX ADMIN — ROCURONIUM BROMIDE 5 MG: 10 SOLUTION INTRAVENOUS at 09:01

## 2025-01-30 RX ADMIN — HYDROMORPHONE HYDROCHLORIDE 0.25 MG: 1 INJECTION, SOLUTION INTRAMUSCULAR; INTRAVENOUS; SUBCUTANEOUS at 08:38

## 2025-01-30 RX ADMIN — PROPOFOL 50 MG: 10 INJECTION, EMULSION INTRAVENOUS at 09:46

## 2025-01-30 RX ADMIN — PRAVASTATIN SODIUM 40 MG: 40 TABLET ORAL at 19:53

## 2025-01-30 RX ADMIN — Medication 120 MG: at 07:38

## 2025-01-30 RX ADMIN — WATER 2000 MG: 1 INJECTION INTRAMUSCULAR; INTRAVENOUS; SUBCUTANEOUS at 22:40

## 2025-01-30 RX ADMIN — FENTANYL CITRATE 25 MCG: 50 INJECTION INTRAMUSCULAR; INTRAVENOUS at 10:46

## 2025-01-30 RX ADMIN — PHENYLEPHRINE HYDROCHLORIDE 80 MCG: 10 INJECTION INTRAVENOUS at 07:59

## 2025-01-30 RX ADMIN — WATER 2000 MG: 1 INJECTION INTRAMUSCULAR; INTRAVENOUS; SUBCUTANEOUS at 16:19

## 2025-01-30 RX ADMIN — PHENYLEPHRINE HYDROCHLORIDE 120 MCG: 10 INJECTION INTRAVENOUS at 07:52

## 2025-01-30 RX ADMIN — ONDANSETRON 4 MG: 2 INJECTION INTRAMUSCULAR; INTRAVENOUS at 07:55

## 2025-01-30 RX ADMIN — FENTANYL CITRATE 50 MCG: 50 INJECTION INTRAMUSCULAR; INTRAVENOUS at 07:34

## 2025-01-30 RX ADMIN — SODIUM CHLORIDE, POTASSIUM CHLORIDE, SODIUM LACTATE AND CALCIUM CHLORIDE: 600; 310; 30; 20 INJECTION, SOLUTION INTRAVENOUS at 06:56

## 2025-01-30 RX ADMIN — ROCURONIUM BROMIDE 45 MG: 10 SOLUTION INTRAVENOUS at 07:48

## 2025-01-30 RX ADMIN — PHENYLEPHRINE HYDROCHLORIDE 40 MCG/MIN: 10 INJECTION INTRAVENOUS at 07:52

## 2025-01-30 RX ADMIN — TAMSULOSIN HYDROCHLORIDE 0.4 MG: 0.4 CAPSULE ORAL at 19:50

## 2025-01-30 RX ADMIN — PROPOFOL 150 MG: 10 INJECTION, EMULSION INTRAVENOUS at 07:37

## 2025-01-30 RX ADMIN — DEXMEDETOMIDINE 8 MCG: 100 INJECTION, SOLUTION, CONCENTRATE INTRAVENOUS at 07:44

## 2025-01-30 RX ADMIN — WATER 2000 MG: 1 INJECTION INTRAMUSCULAR; INTRAVENOUS; SUBCUTANEOUS at 07:55

## 2025-01-30 RX ADMIN — SODIUM CHLORIDE, POTASSIUM CHLORIDE, SODIUM LACTATE AND CALCIUM CHLORIDE: 600; 310; 30; 20 INJECTION, SOLUTION INTRAVENOUS at 09:42

## 2025-01-30 RX ADMIN — PHENYLEPHRINE HYDROCHLORIDE 120 MCG: 10 INJECTION INTRAVENOUS at 07:53

## 2025-01-30 RX ADMIN — ROCURONIUM BROMIDE 10 MG: 10 SOLUTION INTRAVENOUS at 08:37

## 2025-01-30 RX ADMIN — EPHEDRINE SULFATE 5 MG: 50 INJECTION INTRAVENOUS at 08:29

## 2025-01-30 RX ADMIN — SENNOSIDES AND DOCUSATE SODIUM 1 TABLET: 50; 8.6 TABLET ORAL at 19:52

## 2025-01-30 RX ADMIN — PROPOFOL 50 MG: 10 INJECTION, EMULSION INTRAVENOUS at 07:40

## 2025-01-30 RX ADMIN — DEXAMETHASONE SODIUM PHOSPHATE 4 MG: 4 INJECTION INTRA-ARTICULAR; INTRALESIONAL; INTRAMUSCULAR; INTRAVENOUS; SOFT TISSUE at 16:19

## 2025-01-30 RX ADMIN — GLYCOPYRROLATE 0.2 MG: 0.2 INJECTION INTRAMUSCULAR; INTRAVENOUS at 07:58

## 2025-01-30 RX ADMIN — SODIUM CHLORIDE AND POTASSIUM CHLORIDE: .9; .15 SOLUTION INTRAVENOUS at 12:31

## 2025-01-30 RX ADMIN — ACETAMINOPHEN 1000 MG: 500 TABLET ORAL at 06:58

## 2025-01-30 RX ADMIN — FENTANYL CITRATE 50 MCG: 50 INJECTION INTRAMUSCULAR; INTRAVENOUS at 08:06

## 2025-01-30 RX ADMIN — GLYCOPYRROLATE 0.2 MG: 0.2 INJECTION INTRAMUSCULAR; INTRAVENOUS at 07:52

## 2025-01-30 RX ADMIN — ROCURONIUM BROMIDE 10 MG: 10 SOLUTION INTRAVENOUS at 07:55

## 2025-01-30 RX ADMIN — SUGAMMADEX 200 MG: 100 INJECTION, SOLUTION INTRAVENOUS at 09:55

## 2025-01-30 RX ADMIN — FENTANYL CITRATE 25 MCG: 50 INJECTION INTRAMUSCULAR; INTRAVENOUS at 11:28

## 2025-01-30 RX ADMIN — FENTANYL CITRATE 25 MCG: 50 INJECTION INTRAMUSCULAR; INTRAVENOUS at 10:58

## 2025-01-30 RX ADMIN — PHENYLEPHRINE HYDROCHLORIDE 80 MCG: 10 INJECTION INTRAVENOUS at 09:15

## 2025-01-30 RX ADMIN — ROCURONIUM BROMIDE 5 MG: 10 SOLUTION INTRAVENOUS at 07:37

## 2025-01-30 RX ADMIN — CYCLOBENZAPRINE 10 MG: 10 TABLET, FILM COATED ORAL at 21:16

## 2025-01-30 RX ADMIN — DEXMEDETOMIDINE 4 MCG: 100 INJECTION, SOLUTION, CONCENTRATE INTRAVENOUS at 07:47

## 2025-01-30 RX ADMIN — PHENYLEPHRINE HYDROCHLORIDE 60 MCG: 10 INJECTION INTRAVENOUS at 08:25

## 2025-01-30 RX ADMIN — DEXAMETHASONE SODIUM PHOSPHATE 8 MG: 4 INJECTION, SOLUTION INTRAMUSCULAR; INTRAVENOUS at 07:55

## 2025-01-30 RX ADMIN — SPIRONOLACTONE 25 MG: 25 TABLET ORAL at 12:17

## 2025-01-30 RX ADMIN — SODIUM CHLORIDE, POTASSIUM CHLORIDE, SODIUM LACTATE AND CALCIUM CHLORIDE: 600; 310; 30; 20 INJECTION, SOLUTION INTRAVENOUS at 07:26

## 2025-01-30 RX ADMIN — SENNOSIDES AND DOCUSATE SODIUM 1 TABLET: 50; 8.6 TABLET ORAL at 12:18

## 2025-01-30 RX ADMIN — ACETAMINOPHEN 650 MG: 325 TABLET ORAL at 12:17

## 2025-01-30 RX ADMIN — DEXAMETHASONE SODIUM PHOSPHATE 4 MG: 4 INJECTION INTRA-ARTICULAR; INTRALESIONAL; INTRAMUSCULAR; INTRAVENOUS; SOFT TISSUE at 22:40

## 2025-01-30 ASSESSMENT — PAIN DESCRIPTION - ORIENTATION
ORIENTATION: ANTERIOR

## 2025-01-30 ASSESSMENT — PAIN SCALES - GENERAL
PAINLEVEL_OUTOF10: 6
PAINLEVEL_OUTOF10: 6
PAINLEVEL_OUTOF10: 3
PAINLEVEL_OUTOF10: 4
PAINLEVEL_OUTOF10: 5
PAINLEVEL_OUTOF10: 5

## 2025-01-30 ASSESSMENT — PAIN DESCRIPTION - DESCRIPTORS
DESCRIPTORS: ACHING

## 2025-01-30 ASSESSMENT — PAIN DESCRIPTION - LOCATION
LOCATION: NECK

## 2025-01-30 ASSESSMENT — PAIN - FUNCTIONAL ASSESSMENT: PAIN_FUNCTIONAL_ASSESSMENT: 0-10

## 2025-01-30 NOTE — ANESTHESIA PRE PROCEDURE
10/23/2021   • Right hand pain 10/23/2021   • S/P trigger finger release 10/23/2021   • Swelling of left knee joint 10/23/2021   • Trigger little finger of right hand 10/23/2021       Past Surgical History:        Procedure Laterality Date   • COLONOSCOPY N/A 2019    COLONOSCOPY performed by [facility] Person MD at [facility] ENDOSCOPY   • FINGER SURGERY Left     DEPUYTREN'S CONTRACTURE SURGERY X3   • KNEE CARTILAGE SURGERY Left 2023   • MOHS SURGERY      X5   • PROSTATE BIOPSY     • ROTATOR CUFF REPAIR Left    • TOTAL HIP ARTHROPLASTY Left 2013   • TOTAL HIP ARTHROPLASTY Right 2013       Social History:    Social History     Tobacco Use   • Smoking status: Former     Current packs/day: 0.00     Types: Cigarettes     Quit date: 1972     Years since quittin.6   • Smokeless tobacco: Never   Substance Use Topics   • Alcohol use: Yes     Alcohol/week: 3.0 standard drinks of alcohol     Comment: OCCAS                                Counseling given: Not Answered      Vital Signs (Current):   Vitals:    25 0618   BP: (!) 150/77   Pulse: 67   Resp: 14   Temp: 98.3 °F (36.8 °C)   TempSrc: Oral   SpO2: 96%   Weight: 93.8 kg (206 lb 12.7 oz)   Height: 1.778 m (5' 10\")                                              BP Readings from Last 3 Encounters:   25 (!) 150/77   25 135/73   24 119/66       NPO Status: Time of last liquid consumption: 400                        Time of last solid consumption:                         Date of last liquid consumption: 25                        Date of last solid food consumption: 25    BMI:   Wt Readings from Last 3 Encounters:   25 93.8 kg (206 lb 12.7 oz)   25 93.8 kg (206 lb 12.7 oz)   24 90.7 kg (200 lb)     Body mass index is 29.67 kg/m².    CBC:   Lab Results   Component Value Date/Time    WBC 8.1 2025 10:21 AM    RBC 4.23 2025 10:21 AM    HGB 13.1 2025 10:21 AM    HCT 40.4 2025 10:21

## 2025-01-30 NOTE — CARE COORDINATION
Transition of Care Plan:    RUR: 7%   Prior Level of Functioning: Independent w/ Adls and IADls at baseline \"Lives with his spouse in a 1 level home with 24 steps to enter\".   Preferred Pharmacy;   [facility] [id number] - [facility] [id number] [place] [address] [place] - P [phone] - F [phone] [[id number]]   The pt has hx of HH but unable to recall the name of the agency.   Disposition: Home with Family Assistance and Home Health services   Home Health; Accepted   [facility] Updated.  The pt reported that he did not have a preference in a HH agency if this cm was unable to locate the HH agency that he utilized in the past following his Hip procedure.     GURWINDER: 24-48hrs.    Follow up appointments: PCP   DME needed: None \"The pt owns the needed DME\"   Transportation at discharge: Family   IM/IMM Medicare/ letter given: Received   Caregiver Contact:   Sima Benson (Spouse) [phone]  Discharge Caregiver contacted prior to discharge? N/A   Care Conference needed? N/A   Barriers to discharge: Medical, PT/OT clearance, Home Health, Drain, Pain control      01/30/25 1259   Service Assessment   Patient Orientation Alert and Oriented   Cognition Alert   History Provided By Patient   Primary Caregiver Self   Support Systems Spouse/Significant Other   Patient's Healthcare Decision Maker is: Legal Next of Kin   PCP Verified by CM Yes   Last Visit to PCP Within last 3 months   Prior Functional Level Independent in ADLs/IADLs   Current Functional Level Independent in ADLs/IADLs   Can patient return to prior living arrangement Yes   Ability to make needs known: Good   Family able to assist with home care needs: Yes   Would you like for me to discuss the discharge plan with any other family members/significant others, and if so, who? Yes  (Sima Benson (Spouse)  [phone])   Financial Resources Medicare   Social/Functional History   Lives With Spouse   Type of Home House   Home Layout Two level   Home

## 2025-01-30 NOTE — PROGRESS NOTES
Spine Surgery Progress Note  Niurka Cowan PA-C          Admit Date: 2025   LOS: 0 days        Daily Progress Note: 2025    POD:Day of Surgery    S/P: Procedure(s):  C4-C7 ANTERIOR CERVICAL DISCECTOMY  AND FUSION WITH PEEK ALLOGRAFT AND PLATE    Subjective:     Pain well-controlled. Sore throat with swallowing. Hands numb BUE uncoordinated; left worse than right. Has not ambulated or voided yet.  Denies nausea, vomiting, headache, and dyspnea.   Pt resting comfortably in bed.    Objective:     Vital signs  Temp (24hrs), Av.5 °F (36.4 °C), Min:96.8 °F (36 °C), Max:98.3 °F (36.8 °C)    07 -  1900  In: 1250 [I.V.:1250]  Out: -   No intake/output data recorded.    /75   Pulse 63   Temp 96.8 °F (36 °C) (Axillary)   Resp 16   Ht 1.778 m (5' 10\")   Wt 93.8 kg (206 lb 12.7 oz)   SpO2 98%   BMI 29.67 kg/m²            Physical Exam:  Gen: No acute distress.   Neuro: A&Ox3. Follows commands. Speech clear. Affect normal.  GARCIA. Strength 5/5 in UE and LE BL.  Sensation intact.  Difficulty with finger to nose bilaterally  Ataxia  Gait deferred.  Calves soft and supple; No pain with passive stretch  Skin: Incision C/D/I    24 hour results:    Recent Results (from the past 24 hour(s))   POCT Glucose    Collection Time: 25  7:01 AM   Result Value Ref Range    POC Glucose 101 65 - 117 mg/dL    Performed by: SAL Reid           Assessment:     Active Problems:    * No active hospital problems. *  Resolved Problems:    * No resolved hospital problems. *      Plan:     s/p C4-7 ACDF  -PT/OT - in collar    -Pain control - scheduled tylenol, gabapentin (states he takes 600mg BID at home, on 200mg TID here); prn oxycodone   -Decadron x 3 doses   -Monitor drain output q 8hr   -ADAT   -Bladder checks until voiding   -cont home meds as ordered    Readiness for discharge:     [] Vital Signs stable    [] Hgb stable    [] + Voiding    [] Wound intact, drainage minimal    [] Tolerating PO

## 2025-01-30 NOTE — FLOWSHEET NOTE
01/30/25 1001   Closed/Suction Drain Anterior;Left Neck Accordion   Placement Date/Time: 01/30/25 0939   Present on Admission/Arrival: No  Inserted by: Dr. Neves  Tube Number: 1  Orientation: Anterior;Left  Location: Neck  Drain Tube Type: Accordion  Size : 10 fr  Tube Shape: Round  Drain Reservoir Size (ml): 400 ...   Site Description Clean, dry & intact   Dressing Status New dressing applied   Drainage Appearance Bright red   Drain Status Compressed

## 2025-01-30 NOTE — FLOWSHEET NOTE
01/30/25 0816   Family Communication    Relationship to Patient Spouse    Phone Number Sima [phone]   Family/Significant Other Update Called   Delivery Origin Nurse   Message Disposition Family present - message delivered   Update Given Yes   Family Communication   Family Update Message Procedure started;Patient stable

## 2025-01-30 NOTE — BRIEF OP NOTE
Brief Postoperative Note      Patient: Demian Alberto Jr.  YOB: 1940  MRN: [id number]    Date of Procedure: 1/30/2025    Pre-Op Diagnosis Codes:      * Cervical spinal stenosis [M48.02]    Post-Op Diagnosis: Same       Procedure(s):  C4-C7 ANTERIOR CERVICAL DISCECTOMY  AND FUSION WITH PEEK ALLOGRAFT AND PLATE    Surgeon(s):  Dimitri Neves MD    Assistant:  Surgical Assistant: Ervin Sofia    Anesthesia: General    Estimated Blood Loss (mL): less than 50     Complications: None    Specimens:   * No specimens in log *    Implants:  Implant Name Type Inv. Item Serial No.  Lot No. LRB No. Used Action   GRAFT BNE SUB M 5ML CANC DBM FRMBL CELLULAR VIVIGEN - [id number]  GRAFT BNE SUB M 5ML CANC DBM FRMBL CELLULAR VIVIGEN [id number] [facility]- NA N/A 1 Implanted   GRAFT BNE MED - [id number]  GRAFT BNE MED [id number] BIOLOGICA NA N/A 1 Implanted   SPACER SPNL LORDTC 360 DEG STD [id number] MM STRL ACIS PROTI - SNA  SPACER SPNL LORDTC 360 DEG STD [id number] MM STRL ACIS PROTI NA [facility] [employer] SY[employer] SPINE-WD [id number] N/A 1 Implanted   SPACER SPNL LORDTC 360 DEG STD [id number] MM STRL ACIS PROTI - SNA  SPACER SPNL LORDTC 360 DEG STD [id number] MM STRL ACIS PROTI NA [facility] [email or web address] SPINE-WD [id number] N/A 1 Implanted   SPACER SPNL LORDTC 360 DEG STD [id number] MM STRL ACIS PROTI - SNA  SPACER SPNL LORDTC 360 DEG STD [id number] MM STRL ACIS PROTI NA [facility] [email or web address] SPINE-WD [id number] N/A 1 Implanted   PLATE SPNL L51MM ANT BILAT CERV TI 3 LEV GERONIMO HYBRID SKYLINE - SNA  PLATE SPNL L51MM ANT BILAT CERV TI 3 LEV GERONIMO HYBRID SKYLINE NA [email or web address] SPINE-WD NA N/A 1 Implanted   SCREW SPNL L16MM DIA4MM ANT CERV TI SELF DRL FULL THRD - SNA  SCREW SPNL L16MM DIA4MM ANT CERV TI SELF DRL FULL THRD NA [employer] SYNTHES SPINE-WD NA N/A 2 Implanted   SCREW SPNL L16MM DIA4MM ANT CERV TI SELF DRL ODILIA ANG FULL - SNA  SCREW SPNL L16MM DIA4MM ANT CERV TI SELF DRL ODILIA ANG FULL NA [employer] SYNTHES SPINE-WD NA N/A 6

## 2025-01-30 NOTE — PERIOP NOTE
TRANSFER - OUT REPORT:    Verbal report given to 5w RN(name) on Demian Alberto Jr.  being transferred to 5w (unit) for routine post-op       Report consisted of patient’s Situation, Background, Assessment and   Recommendations(SBAR).     Time Pre op antibiotic given:0755  Anesthesia Stop time: 1012    Information from the following report(s) SBAR and OR Summary was reviewed with the receiving nurse.    Opportunity for questions and clarification was provided.     Is the patient on 02? Yes       L/Min 2       Other     Is the patient on a monitor? No    Is the nurse transporting with the patient? No    At transfer, are there Patient Belongings with the patient?  If Yes, please note/list: bag of clothes on bed with patient      Surgical Waiting Area notified of patient's transfer from PACU? Yes

## 2025-01-30 NOTE — ANESTHESIA POSTPROCEDURE EVALUATION
Department of Anesthesiology  Postprocedure Note    Patient: Demian Alberto Jr.  MRN: [id number]  YOB: 1940  Date of evaluation: 1/30/2025    Procedure Summary       Date: 01/30/25 Room / Location: [facility] MAIN OR  / [facility] MAIN OR    Anesthesia Start: 0726 Anesthesia Stop: 1016    Procedure: C4-C7 ANTERIOR CERVICAL DISCECTOMY  AND FUSION WITH PEEK ALLOGRAFT AND PLATE (Neck) Diagnosis:       Cervical spinal stenosis      (Cervical spinal stenosis [M48.02])    Providers: Dimitri Neves MD Responsible Provider: Freddie Burns Jr., MD    Anesthesia Type: General ASA Status: 2            Anesthesia Type: General    Lizeth Phase I:      Lizeth Phase II:      Anesthesia Post Evaluation    Patient location during evaluation: PACU  Patient participation: complete - patient participated  Level of consciousness: awake  Airway patency: patent  Nausea & Vomiting: no nausea  Cardiovascular status: blood pressure returned to baseline and hemodynamically stable  Respiratory status: acceptable  Hydration status: stable  Multimodal analgesia pain management approach    There were no known notable events for this encounter.

## 2025-01-30 NOTE — PROGRESS NOTES
Physical Therapy 1/30/2025    Orders received and chart reviewed up to date. Pt POD 0 C4-C7 ACDF. Will follow-up tomorrow for PT evaluation/ as appropriate.     Thank you.  Rachna Valenzuela, PT, DPT

## 2025-01-30 NOTE — FLOWSHEET NOTE
01/30/25 1000   Incision 01/30/25 Neck Left;Anterior   Date First Assessed/Time First Assessed: 01/30/25 1000   Present on Original Admission: No  Location: Neck  Incision Location Orientation: Left;Anterior   Dressing Status Clean;Dry;Intact   Incision Cleansed Cleansed with saline   Dressing/Treatment Surgical glue;Other (comment)   Closure Sutures;Surgical glue   Margins Approximated

## 2025-01-31 VITALS
RESPIRATION RATE: 16 BRPM | SYSTOLIC BLOOD PRESSURE: 123 MMHG | WEIGHT: 206.79 LBS | TEMPERATURE: 97.7 F | HEIGHT: 70 IN | OXYGEN SATURATION: 96 % | DIASTOLIC BLOOD PRESSURE: 57 MMHG | HEART RATE: 52 BPM | BODY MASS INDEX: 29.6 KG/M2

## 2025-01-31 PROCEDURE — 97161 PT EVAL LOW COMPLEX 20 MIN: CPT

## 2025-01-31 PROCEDURE — 2500000003 HC RX 250 WO HCPCS: Performed by: NEUROLOGICAL SURGERY

## 2025-01-31 PROCEDURE — 99024 POSTOP FOLLOW-UP VISIT: CPT | Performed by: PHYSICIAN ASSISTANT

## 2025-01-31 PROCEDURE — 6360000002 HC RX W HCPCS: Performed by: NEUROLOGICAL SURGERY

## 2025-01-31 PROCEDURE — 6370000000 HC RX 637 (ALT 250 FOR IP): Performed by: PHYSICIAN ASSISTANT

## 2025-01-31 PROCEDURE — 97116 GAIT TRAINING THERAPY: CPT

## 2025-01-31 PROCEDURE — 97165 OT EVAL LOW COMPLEX 30 MIN: CPT

## 2025-01-31 PROCEDURE — 6370000000 HC RX 637 (ALT 250 FOR IP): Performed by: NEUROLOGICAL SURGERY

## 2025-01-31 PROCEDURE — 97530 THERAPEUTIC ACTIVITIES: CPT

## 2025-01-31 PROCEDURE — 97535 SELF CARE MNGMENT TRAINING: CPT

## 2025-01-31 RX ORDER — ACETAMINOPHEN 325 MG/1
650 TABLET ORAL EVERY 6 HOURS
Qty: 1 TABLET | Refills: 0 | Status: SHIPPED
Start: 2025-01-31

## 2025-01-31 RX ADMIN — SENNOSIDES AND DOCUSATE SODIUM 1 TABLET: 50; 8.6 TABLET ORAL at 10:36

## 2025-01-31 RX ADMIN — SPIRONOLACTONE 25 MG: 25 TABLET ORAL at 10:35

## 2025-01-31 RX ADMIN — SODIUM CHLORIDE, PRESERVATIVE FREE 10 ML: 5 INJECTION INTRAVENOUS at 10:46

## 2025-01-31 RX ADMIN — DEXAMETHASONE SODIUM PHOSPHATE 4 MG: 4 INJECTION INTRA-ARTICULAR; INTRALESIONAL; INTRAMUSCULAR; INTRAVENOUS; SOFT TISSUE at 10:36

## 2025-01-31 RX ADMIN — GABAPENTIN 200 MG: 100 CAPSULE ORAL at 10:35

## 2025-01-31 RX ADMIN — ACETAMINOPHEN 650 MG: 325 TABLET ORAL at 10:35

## 2025-01-31 RX ADMIN — FLUOXETINE HYDROCHLORIDE 20 MG: 20 CAPSULE ORAL at 10:35

## 2025-01-31 ASSESSMENT — PAIN SCALES - GENERAL: PAINLEVEL_OUTOF10: 5

## 2025-01-31 NOTE — PROGRESS NOTES
PHYSICAL THERAPY EVALUATION/DISCHARGE    Patient: Demian Alberto Jr. (84 y.o. male)  Date: 1/31/2025  Primary Diagnosis: Cervical spinal stenosis [M48.02]  Cervical stenosis of spinal canal [M48.02]  Procedure(s) (LRB):  C4-C7 ANTERIOR CERVICAL DISCECTOMY  AND FUSION WITH PEEK ALLOGRAFT AND PLATE (N/A) 1 Day Post-Op   Precautions:           Spinal Precautions: No Bending, No Lifting, No Twisting     Required Braces or Orthoses  Cervical: c-collar      ASSESSMENT AND RECOMMENDATIONS:  Based on the objective data below, the patient presents with decreased mobility compared to baseline after C4-C7 ACDF, POD1.  His PMHx is significant for hip and knee replacements and rotator cuff repair.  He reports that he has had weakness and numbness in his hands progressively and has difficulty with handwriting and object manipulation.    At this time, he is demonstrating good overall mobility, noting some brief episodes of trunk sway with walking, but he is able to self correct them.  Dicussed neutral spine precautions and activity recommendations and restrictions at length with patient and wife.  He is clear for D/C home from a PT standpoint when he is medically ready.    Functional Outcome Measure:  The patient scored 24 on the [facility] outcome measure which is indicative of no post acute therapy needs.      Further skilled acute physical therapy is not indicated at this time.       PLAN :  Recommendation for discharge: (in order for the patient to meet his/her long term goals):   Intermittent physical therapy up to 2-3x/week in previous living setting with additional support needed for tasks that require lifting more than 5lbs    Other factors to consider for discharge: no additional factors    IF patient discharges home will need the following DME: none       SUBJECTIVE:   Patient stated “I don't sleep well.”    OBJECTIVE DATA SUMMARY:     Past Medical History:   Diagnosis Date    Arthralgia of left hand 10/23/2021    Basal cell

## 2025-01-31 NOTE — PROGRESS NOTES
Spine Surgery Progress Note  Niurka Cowan PA-C          Admit Date: 2025   LOS: 1 day        Daily Progress Note: 2025    POD:1 Day Post-Op    S/P: Procedure(s):  C4-C7 ANTERIOR CERVICAL DISCECTOMY  AND FUSION WITH PEEK ALLOGRAFT AND PLATE    Subjective:     Doing well on POD#1. Pt has no complaints. He has no pain. Ambulating and voiding without issue. Swallowing without issue. Cleared for safe dc by PT/OT. Denies nausea, vomiting, headache, and dyspnea.   Pt resting comfortably in bed.    Objective:     Vital signs  Temp (24hrs), Av.5 °F (36.4 °C), Min:96.8 °F (36 °C), Max:97.9 °F (36.6 °C)   701 -  190  In: -   Out: 10 [Drains:10]  1901 -  0700  In: 1250 [I.V.:1250]  Out: 95 [Drains:95]    BP (!) 123/57   Pulse 52   Temp 97.7 °F (36.5 °C) (Oral)   Resp 16   Ht 1.778 m (5' 10\")   Wt 93.8 kg (206 lb 12.7 oz)   SpO2 96%   BMI 29.67 kg/m²            Physical Exam:  Gen: No acute distress.   Neuro: A&Ox3. Follows commands. Speech clear. Affect normal.  GARCIA. Strength 5/5 in UE and LE BL.  Sensation intact.  Ataxia - improved  Gait deferred.  Calves soft and supple; No pain with passive stretch  Skin: Incision C/D/I    24 hour results:    No results found for this or any previous visit (from the past 24 hour(s)).       Assessment:     Active Problems:    * No active hospital problems. *  Resolved Problems:    * No resolved hospital problems. *      Plan:     s/p C4-7 ACDF  -PT/OT - in collar    -Pain control - scheduled tylenol, gabapentin (states he takes 600mg BID at home, on 200mg TID here)   -Decadron x 3 doses complete   -D/c HVAC   -ADAT   -cont home meds as ordered    Readiness for discharge:     [x] Vital Signs stable    [x] + Voiding    [x] Wound intact, drainage minimal    [x] Tolerating PO intake     [x] Cleared by PT (OT if applicable)    [x] Adequate pain control on oral medication alone       Activity: up with assist  DVT ppx: SCDs  Dispo: home today    Plan

## 2025-01-31 NOTE — DISCHARGE SUMMARY
Spine Discharge Summary    Patient ID:  Demian Alberto Jr.  [id number]  male  84 y.o.  1940    Admit date: 1/30/2025    Discharge date: 1/31/2025    Admitting Physician: Dimitri Neves MD     Consulting Physician(s):   Treatment Team:   Dimitri Neves MD Alexander, Peter A, MD Hickman, Derwin Heller, Tiffany E, PT Jencik, Cassandra, RN Bush, Susan, OT Smith, Kimberly, RN    Date of Surgery:   1/30/25    Preoperative Diagnosis:  Cervical spinal stenosis [M48.02]    Postoperative Diagnosis:   same    Procedure(s):  C4-C7 ANTERIOR CERVICAL DISCECTOMY  AND FUSION WITH PEEK ALLOGRAFT AND PLATE     Anesthesia Type:   General     Surgeon: Dimitri Neves MD                            HPI:  Pt is a 84 y.o. male who has a history of Cervical spinal stenosis [M48.02]  with pain and limitations of activities of daily living who presents at this time for a C4-C7 ANTERIOR CERVICAL DISCECTOMY  AND FUSION WITH PEEK ALLOGRAFT AND PLATE  following the failure of conservative management.    PMH:   Past Medical History:   Diagnosis Date    Arthralgia of left hand 10/23/2021    Basal cell carcinoma     Bicipital tendinitis of left shoulder 10/23/2021    BPH (benign prostatic hyperplasia)     Brachial neuritis 10/23/2021    Cervicalgia 10/23/2021    Degeneration of cervical intervertebral disc 10/23/2021    Degenerative arthritis of left hand 10/23/2021    Degenerative arthritis of left knee 10/23/2021    Dupuytren's contracture 10/23/2021    Edema, lower extremity 10/23/2021    Hypercholesterolemia     Hypertension     Left knee pain 10/23/2021    Right hand pain 10/23/2021    S/P trigger finger release 10/23/2021    Swelling of left knee joint 10/23/2021    Trigger little finger of right hand 10/23/2021       Body mass index is 29.67 kg/m². : A BMI > 30 is classified as obesity and > 40 is classified as morbid obesity.     Medications upon admission :   Prior to Admission Medications   Prescriptions Last Dose

## 2025-01-31 NOTE — PROGRESS NOTES
Pt educated multiple times to call out prior to moving out of bed. Pt continuously got out of bed of own accord without help. First occurrence pt was half way out of bed and had pulled hemovac out. Second occurrence around 0300 pt was found near bathroom door with bed sheets wrapped around ankles. Pt was put on bed alarm at beginning of this RN's shift.

## 2025-01-31 NOTE — PROGRESS NOTES
OCCUPATIONAL THERAPY EVALUATION/DISCHARGE  Patient: Demian Alberto Jr. (84 y.o. male)  Date: 1/31/2025  Primary Diagnosis: Cervical spinal stenosis [M48.02]  Cervical stenosis of spinal canal [M48.02]  Procedure(s) (LRB):  C4-C7 ANTERIOR CERVICAL DISCECTOMY  AND FUSION WITH PEEK ALLOGRAFT AND PLATE (N/A) 1 Day Post-Op     Precautions:                    ASSESSMENT :  Based on the objective data below, the patient requires supervision and cues for safe ADL completion while maintaining C-spine precautions. Patient is impacted by numbness and weakness in bilateral hands during ADL. Spouse present for session and instructed in safely assisting patient. Patient is eager to return home today. Recommend  OT follow up to ensure safe ADL completion in home environment.     Functional Outcome Measure:  The patient scored 18/24 on the [facility] AM-PAC \"6 Clicks\" Daily Activity Inpatient Short Form outcome measure .      Further skilled acute occupational therapy is not indicated at this time.     PLAN :  Recommend with staff: ADL in bathroom     Recommendation for discharge: (in order for the patient to meet his/her long term goals):   Intermittent occupational therapy up to 2-3x/week in previous living setting    Other factors to consider for discharge: concern for safely navigating or managing the home environment    IF patient discharges home will need the following DME: continuing to assess with progress     SUBJECTIVE:   Patient pleasant and cooperative.     OBJECTIVE DATA SUMMARY:     Past Medical History:   Diagnosis Date    Arthralgia of left hand 10/23/2021    Basal cell carcinoma     Bicipital tendinitis of left shoulder 10/23/2021    BPH (benign prostatic hyperplasia)     Brachial neuritis 10/23/2021    Cervicalgia 10/23/2021    Degeneration of cervical intervertebral disc 10/23/2021    Degenerative arthritis of left hand 10/23/2021    Degenerative arthritis of left knee 10/23/2021    Dupuytren's    4.  Putting on and taking off regular upper body clothing? []  1 []  2 [x]  3 []  4   5.  Taking care of personal grooming such as brushing teeth? []  1 []  2 [x]  3 []  4   6.  Eating meals? []  1 []  2 [x]  3 []  4   © , Trustees of [facility], under license to [employer]. All rights reserved     Score: 18/24     Interpretation of Tool:  Represents clinically-significant functional categories (i.e. Activities of daily living).    Cutoff score 39.4 (19) correlates to a good likelihood of discharging home versus a facility  Molly Alvarado, Christie Bradley, Spike Bagley, Lakisha Mcdermott, Grant Parra, Low Alvarado, AM-PAC “6-Clicks” Functional Assessment Scores Predict Acute Care Hospital Discharge Destination, Physical Therapy, Volume 94, Issue 9, 2014, Pages 9418-0074, [email or web address]://[email or web address]    Pain Ratin/10   Pain Intervention(s):       Activity Tolerance:   Good    After treatment:   Patient left in no apparent distress sitting up in chair, Call bell within reach, and Caregiver / family present    COMMUNICATION/EDUCATION:   The patient's plan of care was discussed with: physical therapist and registered nurse    Patient Education  Education Given To: Patient  Education Provided: Role of Therapy;ADL Adaptive Strategies;Transfer Training;Fall Prevention Strategies;Precautions  Education Method: Demonstration;Verbal;Teach Back    Thank you for this referral.  Lexie Helm OT  Minutes: 40    Occupational Therapy Evaluation Charge Determination   History Examination Decision-Making   LOW Complexity : Brief history review  LOW Complexity: 1-3 Performance deficits relating to physical, cognitive, or psychosocial skills that result in activity limitations and/or participation restrictions LOW Complexity: No comorbidities that affect functional and  no verbal  or physical assist needed to complete eval tasks   Based on the above components, the patient evaluation

## 2025-01-31 NOTE — CARE COORDINATION
ransition of Care Plan:     RUR: 8%   Prior Level of Functioning: Independent w/ Adls and IADls at baseline \"Lives with his spouse in a 1 level home with 24 steps to enter\".   Preferred Pharmacy;   [facility] [id number] - [facility] [address] - P [phone] - F [phone] [[id number]]   The pt has hx of HH but unable to recall the name of the agency.   Disposition: Home with Family Assistance and Home Health services   Home Health; Accepted   [facility] Updated.  The pt reported that he did not have a preference in a HH agency if this cm was unable to locate the HH agency that he utilized in the past following his Hip procedure.      GURWINDER: 1/31/25    Follow up appointments: PCP   DME needed: None \"The pt owns the needed DME\"   Transportation at discharge: Family   IM/IMM Medicare/ letter given: 2nd IMM given on 1/31/25  Caregiver Contact:   Sima Benson (Spouse) [phone]  Discharge Caregiver contacted prior to discharge? N/A   Care Conference needed? N/A   Barriers to discharge: PT/OT clearance    CM met with medical team in IDR at 9:30 AM to discuss pt's discharge plan. Pt is expected to be discharged today, 1/31, pending PT/OT clearance. Pt has been accepted by [facility] for home health services.    CM met with pt at bedside to discuss discharge plan and provide Important Message from Medicare (IMM). Pt signed IMM. Copy to pt and copy in chart. Pt agreeable to discharge plan. Pt confirmed that he has been in contact with [facility]. Pt's spouse will transport him home from the hospital.    Pt clear for discharge from a case management perspective.    Silvana Sagastume [facility] [place],   [phone]

## 2025-01-31 NOTE — DISCHARGE INSTRUCTIONS
After Hospital Care Plan:  Discharge Instructions Cervical (Neck) Spine Surgery Dr. Neves    Patient Name: Demian Alberto Jr.    Date of procedure: 1/30/25  Date of discharge: 1/31/2025    Procedure: Procedure(s):  C4-C7 ANTERIOR CERVICAL DISCECTOMY  AND FUSION WITH PEEK ALLOGRAFT AND PLATE        Follow up appointments  -follow up with Dr. Neves in 2 weeks.  [phone] to make an appointment as soon as you get home from the hospital.    When to call your Spine Surgeon:  -Difficulty swallowing that is worse than when you left the hospital.  -Signs of infection-if your incision is red; continues to have drainage; drainage has a foul odor or if you have a persistent fever over 101 degrees for 24 hours  -nausea or vomiting, severe headache  -changes in sensation in your arms or legs (numbness, tingling, loss of color)  -increased weakness-greater than before your surgery  -severe pain or pain not relieved by medications  -Signs of a blood clot in your leg-calf pain, tenderness, redness, swelling of lower leg    When to call your Primary Care Physician:  -Concerns about medical conditions such as diabetes, high blood pressure, asthma, congestive heart failure  -Call if blood sugars are elevated, persistent headache or dizziness, coughing or congestion, constipation or diarrhea, burning with urination, abnormal heart rate    When to call 911 and go to the nearest emergency room:  -acute onset of chest pain, shortness of breath, difficulty breathing    Activity  - You are going home a well person, be as active as possible.  Your only exercise should be walking.  Start with short frequent walks and increase your walking distance each day.  -Limit the amount of time you sit to 20-30 minute intervals.  Sitting for prolonged periods of time will be uncomfortable for you following surgery.  - Do NOT lift anything over 5 pounds  -From now on, even when lifting light weight, bend with your knees and not your

## 2025-02-01 NOTE — OP NOTE
[address]  [address]                            OPERATIVE REPORT      PATIENT NAME: DARNELL MEDINA                : 1940  MED REC NO: [id number]                       ROOM: 538  ACCOUNT NO: [id number]                       ADMIT DATE: 2025  PROVIDER: Dimitri Neves MD    DATE OF SERVICE:  2025    PREOPERATIVE DIAGNOSES:  Cervical stenosis with myelopathy, C4 through C7.    POSTOPERATIVE DIAGNOSES:  Cervical stenosis with myelopathy, C4 through C7.    PROCEDURES PERFORMED:  C4-5, C5-6, C6-7 anterior cervical diskectomy with instrumented fusion C4 through C7 using DePuy PEEK allograft spacers, packed with ViviGen allograft and DePuy freestanding anterior skyline plate, use of operating microscope.    SURGEON:  Dimitri Neves MD    ASSISTANT:  Ervin Sofia SA    ANESTHESIA:  General endotracheal anesthesia.    ESTIMATED BLOOD LOSS:  50 mL.    SPECIMENS REMOVED:  None.    INTRAOPERATIVE FINDINGS:  Disc osteophyte causing cord compression.     COMPLICATIONS:  None.    IMPLANTS:  As noted above.    INDICATIONS:  An 84-year-old gentleman with progressive myelopathy.  MRI showed cervical stenosis from disc osteophyte complex.  After discussing treatment options, risks of surgery, informed consent was obtained.    DESCRIPTION OF PROCEDURE:  The patient was taken to the operating room, placed under general endotracheal anesthesia.  All necessary lines and monitors were placed.  Given appropriate dose of IV antibiotics.  SCDs were placed.  He was placed supine on the operating table.  Arms tucked by his side.  Shoulders taped down.  The anterior cervical region was prepped and draped in a standard sterile fashion.  Incision was made from the midline to the left sternocleidomastoid with a skin knife, carried down with Bovie electrocautery in the subcutaneous tissue.  Platysma was undermined rostrally and caudally.  Plane medial

## 2025-02-05 NOTE — PROGRESS NOTES
Physician Progress Note      PATIENT:               DARNELL MEDINA  [facility] #:                  [id number]  :                       1940  ADMIT DATE:       2025 5:44 AM  DISCH DATE:        2025 2:22 PM  RESPONDING  PROVIDER #:        Niurka HERNANDEZ          QUERY TEXT:    Patient admitted with Cervical disc disorder. Pt noted to have BPH and was   treated with Flomax.?If possible, please document in progress notes and   discharge summary if you are evaluating and/or treating any of the following:  The medical record reflects the following:  Risk Factors: BPH  Clinical Indicators:  BPH  Treatment: flomax    Thank you  Vilma Ambrosio RN, CDI, CCDS, CRCR  Certified  Clinical Documentation   [email or web address]  Options provided:  -- BPH with partial/complete urinary obstruction  -- BPH with urinary retention without obstruction  -- Other - I will add my own diagnosis  -- Disagree - Not applicable / Not valid  -- Disagree - Clinically unable to determine / Unknown  -- Refer to Clinical Documentation Reviewer    PROVIDER RESPONSE TEXT:    Provider disagreed with this query.    Query created by: Vilma Ambrosio on 2025 7:18 AM      Electronically signed by:  Niurka HERNANDEZ 2025 9:42 AM

## 2025-04-11 ENCOUNTER — PROCEDURE VISIT (OUTPATIENT)
Age: 85
End: 2025-04-11
Payer: MEDICARE

## 2025-04-11 DIAGNOSIS — R20.2 PARESTHESIA: Primary | ICD-10-CM

## 2025-04-11 PROCEDURE — 95886 MUSC TEST DONE W/N TEST COMP: CPT | Performed by: PSYCHIATRY & NEUROLOGY

## 2025-04-11 PROCEDURE — 95909 NRV CNDJ TST 5-6 STUDIES: CPT | Performed by: PSYCHIATRY & NEUROLOGY

## 2025-04-11 NOTE — PROGRESS NOTES
EMG/ NCS Report  [facility] [address], [address]  [place]  [address]   Ph: [phone]   FAX: [phone]/ [phone]  Test Date:  2019      Test Date:  2025    Patient: DARNELL MEDINA : 1940 Physician: Tien Otot MD   Sex: Male Height: ' \" Ref Phys: AV CARRION   ID#: [id number] Weight:  lbs. Technician: Kelsey Redman     Patient History / Exam:  CC: Cervical surg.  c/o Dr Carrion with no more neck pain but still has persistent numbness index,thumb and third digit of the left hand, which has been going on for several months now.    Patient is coming for radiculopathy evaluation.    (+) decrease sensation L thumb to 3rd fingers  (+) atrophy of L APB muscle          EMG & NCV Findings:  Evaluation of the left median motor nerve showed no response (Wrist) and no response (Elbow).  The left ulnar motor nerve showed normal distal onset latency (2.5 ms), reduced amplitude (5.3 mV), normal conduction velocity (B Elbow-Wrist, 57 m/s), and normal conduction velocity (A Elbow-B Elbow, 56 m/s).  The left median sensory nerve showed no response (Wrist).  The left radial sensory and the left ulnar sensory nerves showed normal distal peak latency (L2.0, L3.0 ms) and normal amplitude (L22.5, L18.0 µV).      All F Wave latencies were within normal limits.      Needle evaluation of the left extensor indicis, the left biceps, the left triceps, and the left deltoid muscles showed diminished recruitment.  The left abductor pollicis brevis muscle showed moderately increased spontaneous activity, recruitment, - Duration, motor unit amplitude, and polyphasic potentials.  All remaining muscles (as indicated in the following table) showed no evidence of electrical instability.        Impression:  ABNORMAL    Extensive electrodiagnostic examination of the left upper extremity shows the following:    Evidence of a left median

## 2025-04-28 ENCOUNTER — HOSPITAL ENCOUNTER (OUTPATIENT)
Facility: HOSPITAL | Age: 85
Discharge: HOME | End: 2025-05-01
Payer: MEDICARE

## 2025-04-28 VITALS
HEIGHT: 70 IN | HEART RATE: 66 BPM | OXYGEN SATURATION: 98 % | DIASTOLIC BLOOD PRESSURE: 75 MMHG | RESPIRATION RATE: 18 BRPM | BODY MASS INDEX: 29.12 KG/M2 | TEMPERATURE: 98.2 F | SYSTOLIC BLOOD PRESSURE: 122 MMHG | WEIGHT: 203.4 LBS

## 2025-04-28 LAB
ANION GAP SERPL CALC-SCNC: 5 MMOL/L (ref 2–12)
BASOPHILS # BLD: 0.03 K/UL (ref 0–0.1)
BASOPHILS NFR BLD: 0.4 % (ref 0–1)
BUN SERPL-MCNC: 14 MG/DL (ref 6–20)
BUN/CREAT SERPL: 13 (ref 12–20)
CALCIUM SERPL-MCNC: 9.1 MG/DL (ref 8.5–10.1)
CHLORIDE SERPL-SCNC: 106 MMOL/L (ref 97–108)
CO2 SERPL-SCNC: 27 MMOL/L (ref 21–32)
CREAT SERPL-MCNC: 1.04 MG/DL (ref 0.7–1.3)
DIFFERENTIAL METHOD BLD: ABNORMAL
EOSINOPHIL # BLD: 0.23 K/UL (ref 0–0.4)
EOSINOPHIL NFR BLD: 3.1 % (ref 0–7)
ERYTHROCYTE [DISTWIDTH] IN BLOOD BY AUTOMATED COUNT: 12.4 % (ref 11.5–14.5)
GLUCOSE SERPL-MCNC: 104 MG/DL (ref 65–100)
HCT VFR BLD AUTO: 38.6 % (ref 36.6–50.3)
HGB BLD-MCNC: 12.7 G/DL (ref 12.1–17)
IMM GRANULOCYTES # BLD AUTO: 0.02 K/UL (ref 0–0.04)
IMM GRANULOCYTES NFR BLD AUTO: 0.3 % (ref 0–0.5)
LYMPHOCYTES # BLD: 1.39 K/UL (ref 0.8–3.5)
LYMPHOCYTES NFR BLD: 18.4 % (ref 12–49)
MCH RBC QN AUTO: 31.1 PG (ref 26–34)
MCHC RBC AUTO-ENTMCNC: 32.9 G/DL (ref 30–36.5)
MCV RBC AUTO: 94.4 FL (ref 80–99)
MONOCYTES # BLD: 0.71 K/UL (ref 0–1)
MONOCYTES NFR BLD: 9.4 % (ref 5–13)
NEUTS SEG # BLD: 5.16 K/UL (ref 1.8–8)
NEUTS SEG NFR BLD: 68.4 % (ref 32–75)
NRBC # BLD: 0 K/UL (ref 0–0.01)
NRBC BLD-RTO: 0 PER 100 WBC
PLATELET # BLD AUTO: 281 K/UL (ref 150–400)
PMV BLD AUTO: 10.8 FL (ref 8.9–12.9)
POTASSIUM SERPL-SCNC: 4.4 MMOL/L (ref 3.5–5.1)
RBC # BLD AUTO: 4.09 M/UL (ref 4.1–5.7)
SODIUM SERPL-SCNC: 138 MMOL/L (ref 136–145)
WBC # BLD AUTO: 7.5 K/UL (ref 4.1–11.1)

## 2025-04-28 PROCEDURE — 85025 COMPLETE CBC W/AUTO DIFF WBC: CPT

## 2025-04-28 PROCEDURE — 80048 BASIC METABOLIC PNL TOTAL CA: CPT

## 2025-04-28 ASSESSMENT — PAIN DESCRIPTION - ORIENTATION: ORIENTATION: LEFT;RIGHT

## 2025-04-28 ASSESSMENT — PAIN DESCRIPTION - LOCATION: LOCATION: WRIST

## 2025-04-28 ASSESSMENT — PAIN SCALES - GENERAL: PAINLEVEL_OUTOF10: 8

## 2025-04-28 ASSESSMENT — PAIN DESCRIPTION - PAIN TYPE: TYPE: CHRONIC PAIN

## 2025-04-28 NOTE — PERIOP NOTE
[address] [address]      MAIN PRE OP             [phone]                                                                                AMBULATORY PRE OP          [phone]    PRE-ADMISSION TESTING    [phone]     Surgery Date:  04/29/25        [facility] staff will call you between 4 and 7pm the day before your surgery with your arrival time.   (If your surgery is on a Monday, we will call you the Friday before.)    Call [phone] after 7pm Monday-Friday if you did not receive this call.    INSTRUCTIONS BEFORE YOUR SURGERY   When You  Arrive Arrive at [facility] Patient Access on 1st floor the day of your surgery.    Have your insurance card, photo ID,living will/advanced directive/POA (if applicable),  and any copayment (if needed)   Food   and   Drink NO solid food after midnight the night before surgery. You can drink clear liquids from midnight until ONE hour prior to your arrival at the hospital on the day of your surgery.     Clear liquids include:  Water  Apple juice (no sediment)  Carbonated beverages  Black coffee(no cream/milk)  Tea(no cream/milk)  Gatorade    No alcohol (beer, wine, liquor) or marijuana (smoking) 24 hours prior to surgery.   No marijuana edibles for 3 days prior to surgery.    Stop smoking cigarettes 14 days before surgery (helps w/healing and breathing).   Medications to   TAKE   Morning of Surgery MEDICATIONS TO TAKE THE MORNING OF SURGERY: Fluoxetine                                      Gabapentin                                      Spironolacton       Medications to STOP  before surgery Non-Steroidal anti-inflammatory Drugs (NSAID's): for example, Diclofenac (Voltaren), Ibuprofen (Advil, Motrin), Naproxen (Aleve) 3 days    STOP all herbal supplements and vitamins(unless prescribed by your doctor), and fish oil for 7 days    Other: OK to take the evening before surgery :               Pravastatin and

## 2025-04-28 NOTE — PERIOP NOTE
CONSENT OBTAINED FOR PROCEDURE PER PROTOCOL. CHG SOAP GIVEN TO PT AND EDUCATION PROVIDED. PATIENT VERBALIZED UNDERSTANDING

## 2025-04-28 NOTE — PERIOP NOTE
CONTACTED MR MEDINA TO INFORM HIM TO HOLD SPIRONOLACTONE THE MORNING OF HIS PROCEDURE ON 04/29/25. THIS IS A CHANGE FROM THE PRINTOUT PROVIDED TO PT. MR MEDINA REPEATED THE INFORMATION BACK, VERBALIZED UNDERSTANDING AND ALL QUESTIONS WERE ANSWERED.

## 2025-04-29 ENCOUNTER — ANESTHESIA (OUTPATIENT)
Facility: HOSPITAL | Age: 85
End: 2025-04-29
Payer: MEDICARE

## 2025-04-29 ENCOUNTER — HOSPITAL ENCOUNTER (OUTPATIENT)
Facility: HOSPITAL | Age: 85
Setting detail: OUTPATIENT SURGERY
Discharge: HOME | End: 2025-04-29
Attending: NEUROLOGICAL SURGERY | Admitting: NEUROLOGICAL SURGERY
Payer: MEDICARE

## 2025-04-29 ENCOUNTER — ANESTHESIA EVENT (OUTPATIENT)
Facility: HOSPITAL | Age: 85
End: 2025-04-29
Payer: MEDICARE

## 2025-04-29 VITALS
HEIGHT: 70 IN | HEART RATE: 60 BPM | WEIGHT: 200 LBS | SYSTOLIC BLOOD PRESSURE: 124 MMHG | RESPIRATION RATE: 13 BRPM | DIASTOLIC BLOOD PRESSURE: 76 MMHG | OXYGEN SATURATION: 93 % | BODY MASS INDEX: 28.63 KG/M2 | TEMPERATURE: 98.1 F

## 2025-04-29 DIAGNOSIS — G89.18 POST-OP PAIN: Primary | ICD-10-CM

## 2025-04-29 PROCEDURE — 2500000003 HC RX 250 WO HCPCS: Performed by: NEUROLOGICAL SURGERY

## 2025-04-29 PROCEDURE — 3600000002 HC SURGERY LEVEL 2 BASE: Performed by: NEUROLOGICAL SURGERY

## 2025-04-29 PROCEDURE — 3700000001 HC ADD 15 MINUTES (ANESTHESIA): Performed by: NEUROLOGICAL SURGERY

## 2025-04-29 PROCEDURE — 6360000002 HC RX W HCPCS: Performed by: NURSE ANESTHETIST, CERTIFIED REGISTERED

## 2025-04-29 PROCEDURE — 3700000000 HC ANESTHESIA ATTENDED CARE: Performed by: NEUROLOGICAL SURGERY

## 2025-04-29 PROCEDURE — 6360000002 HC RX W HCPCS: Performed by: NEUROLOGICAL SURGERY

## 2025-04-29 PROCEDURE — 3600000012 HC SURGERY LEVEL 2 ADDTL 15MIN: Performed by: NEUROLOGICAL SURGERY

## 2025-04-29 PROCEDURE — 6360000002 HC RX W HCPCS: Performed by: ANESTHESIOLOGY

## 2025-04-29 PROCEDURE — 7100000000 HC PACU RECOVERY - FIRST 15 MIN: Performed by: NEUROLOGICAL SURGERY

## 2025-04-29 PROCEDURE — 2709999900 HC NON-CHARGEABLE SUPPLY: Performed by: NEUROLOGICAL SURGERY

## 2025-04-29 PROCEDURE — 2580000003 HC RX 258: Performed by: ANESTHESIOLOGY

## 2025-04-29 PROCEDURE — 6370000000 HC RX 637 (ALT 250 FOR IP): Performed by: ANESTHESIOLOGY

## 2025-04-29 PROCEDURE — 2580000003 HC RX 258: Performed by: NURSE ANESTHETIST, CERTIFIED REGISTERED

## 2025-04-29 PROCEDURE — 7100000001 HC PACU RECOVERY - ADDTL 15 MIN: Performed by: NEUROLOGICAL SURGERY

## 2025-04-29 PROCEDURE — 2500000003 HC RX 250 WO HCPCS: Performed by: NURSE ANESTHETIST, CERTIFIED REGISTERED

## 2025-04-29 RX ORDER — EPHEDRINE SULFATE 50 MG/ML
INJECTION INTRAVENOUS
Status: DISCONTINUED | OUTPATIENT
Start: 2025-04-29 | End: 2025-04-29 | Stop reason: SDUPTHER

## 2025-04-29 RX ORDER — PROPOFOL 10 MG/ML
INJECTION, EMULSION INTRAVENOUS
Status: DISCONTINUED | OUTPATIENT
Start: 2025-04-29 | End: 2025-04-29 | Stop reason: SDUPTHER

## 2025-04-29 RX ORDER — MIDAZOLAM HYDROCHLORIDE 2 MG/2ML
2 INJECTION, SOLUTION INTRAMUSCULAR; INTRAVENOUS PRN
Status: DISCONTINUED | OUTPATIENT
Start: 2025-04-29 | End: 2025-04-29 | Stop reason: HOSPADM

## 2025-04-29 RX ORDER — ONDANSETRON 2 MG/ML
4 INJECTION INTRAMUSCULAR; INTRAVENOUS
Status: DISCONTINUED | OUTPATIENT
Start: 2025-04-29 | End: 2025-04-29 | Stop reason: HOSPADM

## 2025-04-29 RX ORDER — ACETAMINOPHEN 500 MG
1000 TABLET ORAL ONCE
Status: COMPLETED | OUTPATIENT
Start: 2025-04-29 | End: 2025-04-29

## 2025-04-29 RX ORDER — FENTANYL CITRATE 50 UG/ML
100 INJECTION, SOLUTION INTRAMUSCULAR; INTRAVENOUS
Status: COMPLETED | OUTPATIENT
Start: 2025-04-29 | End: 2025-04-29

## 2025-04-29 RX ORDER — LIDOCAINE HYDROCHLORIDE 20 MG/ML
INJECTION, SOLUTION EPIDURAL; INFILTRATION; INTRACAUDAL; PERINEURAL
Status: DISCONTINUED | OUTPATIENT
Start: 2025-04-29 | End: 2025-04-29 | Stop reason: SDUPTHER

## 2025-04-29 RX ORDER — SODIUM CHLORIDE, SODIUM LACTATE, POTASSIUM CHLORIDE, CALCIUM CHLORIDE 600; 310; 30; 20 MG/100ML; MG/100ML; MG/100ML; MG/100ML
INJECTION, SOLUTION INTRAVENOUS CONTINUOUS
Status: DISCONTINUED | OUTPATIENT
Start: 2025-04-29 | End: 2025-04-29 | Stop reason: HOSPADM

## 2025-04-29 RX ORDER — SODIUM CHLORIDE 0.9 % (FLUSH) 0.9 %
5-40 SYRINGE (ML) INJECTION EVERY 12 HOURS SCHEDULED
Status: DISCONTINUED | OUTPATIENT
Start: 2025-04-29 | End: 2025-04-29 | Stop reason: HOSPADM

## 2025-04-29 RX ORDER — SODIUM CHLORIDE 9 MG/ML
INJECTION, SOLUTION INTRAVENOUS PRN
Status: DISCONTINUED | OUTPATIENT
Start: 2025-04-29 | End: 2025-04-29 | Stop reason: HOSPADM

## 2025-04-29 RX ORDER — SODIUM CHLORIDE, SODIUM LACTATE, POTASSIUM CHLORIDE, CALCIUM CHLORIDE 600; 310; 30; 20 MG/100ML; MG/100ML; MG/100ML; MG/100ML
INJECTION, SOLUTION INTRAVENOUS
Status: DISCONTINUED | OUTPATIENT
Start: 2025-04-29 | End: 2025-04-29 | Stop reason: SDUPTHER

## 2025-04-29 RX ORDER — PROCHLORPERAZINE EDISYLATE 5 MG/ML
5 INJECTION INTRAMUSCULAR; INTRAVENOUS
Status: DISCONTINUED | OUTPATIENT
Start: 2025-04-29 | End: 2025-04-29 | Stop reason: HOSPADM

## 2025-04-29 RX ORDER — LIDOCAINE HYDROCHLORIDE 10 MG/ML
1 INJECTION, SOLUTION EPIDURAL; INFILTRATION; INTRACAUDAL; PERINEURAL
Status: DISCONTINUED | OUTPATIENT
Start: 2025-04-29 | End: 2025-04-29 | Stop reason: HOSPADM

## 2025-04-29 RX ORDER — BUPIVACAINE HYDROCHLORIDE AND EPINEPHRINE 5; 5 MG/ML; UG/ML
INJECTION, SOLUTION EPIDURAL; INTRACAUDAL; PERINEURAL PRN
Status: DISCONTINUED | OUTPATIENT
Start: 2025-04-29 | End: 2025-04-29 | Stop reason: HOSPADM

## 2025-04-29 RX ORDER — NALOXONE HYDROCHLORIDE 0.4 MG/ML
INJECTION, SOLUTION INTRAMUSCULAR; INTRAVENOUS; SUBCUTANEOUS PRN
Status: DISCONTINUED | OUTPATIENT
Start: 2025-04-29 | End: 2025-04-29 | Stop reason: HOSPADM

## 2025-04-29 RX ORDER — HYDROMORPHONE HYDROCHLORIDE 1 MG/ML
0.5 INJECTION, SOLUTION INTRAMUSCULAR; INTRAVENOUS; SUBCUTANEOUS EVERY 5 MIN PRN
Status: DISCONTINUED | OUTPATIENT
Start: 2025-04-29 | End: 2025-04-29 | Stop reason: HOSPADM

## 2025-04-29 RX ORDER — OXYCODONE HYDROCHLORIDE 5 MG/1
5 TABLET ORAL
Status: DISCONTINUED | OUTPATIENT
Start: 2025-04-29 | End: 2025-04-29 | Stop reason: HOSPADM

## 2025-04-29 RX ORDER — ONDANSETRON 2 MG/ML
INJECTION INTRAMUSCULAR; INTRAVENOUS
Status: DISCONTINUED | OUTPATIENT
Start: 2025-04-29 | End: 2025-04-29 | Stop reason: SDUPTHER

## 2025-04-29 RX ORDER — HYDRALAZINE HYDROCHLORIDE 20 MG/ML
10 INJECTION INTRAMUSCULAR; INTRAVENOUS ONCE
Status: DISCONTINUED | OUTPATIENT
Start: 2025-04-29 | End: 2025-04-29 | Stop reason: HOSPADM

## 2025-04-29 RX ORDER — HYDROCODONE BITARTRATE AND ACETAMINOPHEN 7.5; 325 MG/1; MG/1
1 TABLET ORAL EVERY 6 HOURS PRN
Qty: 21 TABLET | Refills: 0 | Status: SHIPPED | OUTPATIENT
Start: 2025-04-29 | End: 2025-05-04

## 2025-04-29 RX ORDER — FENTANYL CITRATE 50 UG/ML
25 INJECTION, SOLUTION INTRAMUSCULAR; INTRAVENOUS EVERY 5 MIN PRN
Status: DISCONTINUED | OUTPATIENT
Start: 2025-04-29 | End: 2025-04-29 | Stop reason: HOSPADM

## 2025-04-29 RX ORDER — SODIUM CHLORIDE 0.9 % (FLUSH) 0.9 %
5-40 SYRINGE (ML) INJECTION PRN
Status: DISCONTINUED | OUTPATIENT
Start: 2025-04-29 | End: 2025-04-29 | Stop reason: HOSPADM

## 2025-04-29 RX ORDER — DEXAMETHASONE SODIUM PHOSPHATE 4 MG/ML
INJECTION, SOLUTION INTRA-ARTICULAR; INTRALESIONAL; INTRAMUSCULAR; INTRAVENOUS; SOFT TISSUE
Status: DISCONTINUED | OUTPATIENT
Start: 2025-04-29 | End: 2025-04-29 | Stop reason: SDUPTHER

## 2025-04-29 RX ADMIN — SODIUM CHLORIDE, SODIUM LACTATE, POTASSIUM CHLORIDE, AND CALCIUM CHLORIDE: .6; .31; .03; .02 INJECTION, SOLUTION INTRAVENOUS at 07:10

## 2025-04-29 RX ADMIN — SODIUM CHLORIDE, POTASSIUM CHLORIDE, SODIUM LACTATE AND CALCIUM CHLORIDE: 600; 310; 30; 20 INJECTION, SOLUTION INTRAVENOUS at 07:29

## 2025-04-29 RX ADMIN — FENTANYL CITRATE 50 MCG: 50 INJECTION INTRAMUSCULAR; INTRAVENOUS at 07:53

## 2025-04-29 RX ADMIN — FENTANYL CITRATE 50 MCG: 50 INJECTION INTRAMUSCULAR; INTRAVENOUS at 07:51

## 2025-04-29 RX ADMIN — ONDANSETRON 4 MG: 2 INJECTION, SOLUTION INTRAMUSCULAR; INTRAVENOUS at 07:40

## 2025-04-29 RX ADMIN — WATER 2000 MG: 1 INJECTION INTRAMUSCULAR; INTRAVENOUS; SUBCUTANEOUS at 07:36

## 2025-04-29 RX ADMIN — EPHEDRINE SULFATE 10 MG: 50 INJECTION INTRAVENOUS at 08:01

## 2025-04-29 RX ADMIN — PROPOFOL 50 MCG/KG/MIN: 10 INJECTION, EMULSION INTRAVENOUS at 07:30

## 2025-04-29 RX ADMIN — LIDOCAINE HYDROCHLORIDE 60 MG: 20 INJECTION, SOLUTION EPIDURAL; INFILTRATION; INTRACAUDAL; PERINEURAL at 07:30

## 2025-04-29 RX ADMIN — PROPOFOL 80 MG: 10 INJECTION, EMULSION INTRAVENOUS at 07:31

## 2025-04-29 RX ADMIN — DEXAMETHASONE SODIUM PHOSPHATE 4 MG: 4 INJECTION INTRA-ARTICULAR; INTRALESIONAL; INTRAMUSCULAR; INTRAVENOUS; SOFT TISSUE at 07:40

## 2025-04-29 RX ADMIN — ACETAMINOPHEN 1000 MG: 500 TABLET ORAL at 07:12

## 2025-04-29 ASSESSMENT — PAIN - FUNCTIONAL ASSESSMENT: PAIN_FUNCTIONAL_ASSESSMENT: 0-10

## 2025-04-29 ASSESSMENT — PAIN SCALES - GENERAL
PAINLEVEL_OUTOF10: 0
PAINLEVEL_OUTOF10: 0

## 2025-04-29 NOTE — ANESTHESIA PRE PROCEDURE
Department of Anesthesiology  Preprocedure Note       Name:  Demian Alberto Jr.   Age:  85 y.o.  :  1940                                          MRN:  [id number]         Date:  2025      Surgeon: Surgeon(s):  Dimitri Neves MD    Procedure: Procedure(s):  LEFT CARPAL TUNNEL RELEASE (MAC WITH LOCAL)    Medications prior to admission:   Prior to Admission medications    Medication Sig Start Date End Date Taking? Authorizing Provider   acetaminophen (TYLENOL) 325 MG tablet Take 2 tablets by mouth every 6 hours 25   Niurka Parra PA   spironolactone (ALDACTONE) 25 MG tablet Take 1 tablet by mouth daily    ProviderKasie MD   tamsulosin (FLOMAX) 0.4 MG capsule Take 1 capsule by mouth nightly    ProviderKasie MD   Multiple Vitamins-Minerals (CENTRUM SILVER PO) Take 1 tablet by mouth nightly    Kasie Schmidt MD   Boswellia-Glucosamine-Vit D (OSTEO BI-FLEX ONE PER DAY PO) Take 1 tablet by mouth every morning    Kasie Schmidt MD   FLUoxetine (PROZAC) 20 MG capsule Take 1 capsule by mouth every morning    Automatic Reconciliation, Ar   gabapentin (NEURONTIN) 100 MG capsule Take 1 capsule by mouth 3 times daily.    Automatic Reconciliation, Ar   pravastatin (PRAVACHOL) 40 MG tablet Take 1 tablet by mouth nightly    Automatic Reconciliation, Ar       Current medications:    Current Facility-Administered Medications   Medication Dose Route Frequency Provider Last Rate Last Admin    lidocaine PF 1 % injection 1 mL  1 mL IntraDERmal Once PRN Yon Mallory MD        acetaminophen (TYLENOL) tablet 1,000 mg  1,000 mg Oral Once Yon Mallory MD        fentaNYL (SUBLIMAZE) injection 100 mcg  100 mcg IntraVENous Once PRN Yon Mallory MD        lactated ringers infusion   IntraVENous Continuous Yon Mallory MD        sodium chloride flush 0.9 % injection 5-40 mL  5-40 mL IntraVENous 2 times per day Yon Mallory MD        sodium chloride flush 0.9 % injection

## 2025-04-29 NOTE — FLOWSHEET NOTE
04/29/25 0749   Family Communication    Relationship to Patient Spouse    Phone Number David Benson [phone]   Family/Significant Other Update Called;Updated   Delivery Origin Nurse   Message Disposition Family present - message delivered   Update Given Yes   Family Communication   Family Update Message Procedure started;Surgeon working

## 2025-04-29 NOTE — ANESTHESIA POSTPROCEDURE EVALUATION
Department of Anesthesiology  Postprocedure Note    Patient: Demian Alberto Jr.  MRN: [id number]  YOB: 1940  Date of evaluation: 4/29/2025    Procedure Summary       Date: 04/29/25 Room / Location: [facility] MAIN OR [address] [place] [address] MAIN OR    Anesthesia Start: 0729 Anesthesia Stop: 0816    Procedure: LEFT CARPAL TUNNEL RELEASE (MAC WITH LOCAL) (Left: Hand) Diagnosis:       Carpal tunnel syndrome on left      (Carpal tunnel syndrome on left [G56.02])    Providers: Dimitri Neves MD Responsible Provider: Madison Goldberg MD    Anesthesia Type: MAC ASA Status: 2            Anesthesia Type: MAC    Lizeth Phase I: Lizeth Score: 10    Lizeth Phase II:      Anesthesia Post Evaluation    Level of consciousness: awake  Airway patency: patent  Nausea & Vomiting: no nausea  Cardiovascular status: hemodynamically stable  Respiratory status: acceptable  Hydration status: euvolemic  Pain management: adequate    No notable events documented.

## 2025-04-29 NOTE — OP NOTE
[address]  [address]                            OPERATIVE REPORT      PATIENT NAME: DARNELL MEDINA                : 1940  MED REC NO: [id number]                       ROOM: OR  ACCOUNT NO: [id number]                       ADMIT DATE: 2025  PROVIDER: Dimitri Neves MD    DATE OF SERVICE:  2025    PREOPERATIVE DIAGNOSES:  Left carpal tunnel syndrome.    POSTOPERATIVE DIAGNOSES:  Left carpal tunnel syndrome.    PROCEDURES PERFORMED:  Left carpal tunnel release.    SURGEON:  Dimitri Neves MD    ASSISTANT:  Jonyn Martinez.    ANESTHESIA:  Conscious sedation and local.    ESTIMATED BLOOD LOSS:  Minimal.    SPECIMENS REMOVED:  None.    INTRAOPERATIVE FINDINGS:  Carpal tunnel syndrome.     COMPLICATIONS:  None.    IMPLANTS:  None.    INDICATIONS:  This is an 85-year-old gentleman with significant carpal tunnel syndrome on the left, refractory to nonoperative care.  Risks and goals were explained.  Informed consent was obtained.    DESCRIPTION OF PROCEDURE:  The patient was taken to the operating room and placed under conscious sedation.  All necessary lines and monitors were placed.  The left hand and arm were prepped up to the elbow.  Palmar surface of the hand was injected with Marcaine.  A linear incision was made on the palmar surface of the hand with a skin knife.  Bipolar was used for hemostasis.  Self-retaining retractor was placed.  The cutdown was taken down to the transverse carpal ligament, which was markedly enlarged and thickened.  I cut down on the transverse carpal ligament and opened this up, exposing the median nerve and the contents of the carpal tunnel.  I then used Metzenbaum scissors to open up the carpal tunnel, opened up the transverse carpal ligament all the way up into the wrist and forearm and distally into the palmar surface of the hand.  External neurolysis was performed.  The transverse carpal

## 2025-04-29 NOTE — BRIEF OP NOTE
Brief Postoperative Note      Patient: Demian Alberto Jr.  YOB: 1940  MRN: [id number]    Date of Procedure: 4/29/2025    Pre-Op Diagnosis Codes:      * Carpal tunnel syndrome on left [G56.02]    Post-Op Diagnosis: Same       Procedure(s):  LEFT CARPAL TUNNEL RELEASE (MAC WITH LOCAL)    Surgeon(s):  Dimitri Neves MD    Assistant:  Surgical Assistant: Jonny Evans    Anesthesia: Monitor Anesthesia Care    Estimated Blood Loss (mL): Minimal    Complications: None    Specimens:   * No specimens in log *    Implants:  * No implants in log *      Drains: * No LDAs found *    Findings:  Infection Present At Time Of Surgery (PATOS) (choose all levels that have infection present):  No infection present  Other Findings: cts    Electronically signed by Dimitri Neves MD on 4/29/2025 at 8:03 AM

## 2025-08-29 ENCOUNTER — OFFICE VISIT (OUTPATIENT)
Age: 85
End: 2025-08-29

## 2025-08-29 VITALS
HEART RATE: 77 BPM | TEMPERATURE: 98.1 F | OXYGEN SATURATION: 95 % | BODY MASS INDEX: 29.09 KG/M2 | WEIGHT: 203.2 LBS | DIASTOLIC BLOOD PRESSURE: 76 MMHG | HEIGHT: 70 IN | RESPIRATION RATE: 18 BRPM | SYSTOLIC BLOOD PRESSURE: 119 MMHG

## 2025-08-29 DIAGNOSIS — S69.92XA INJURY OF LEFT WRIST, INITIAL ENCOUNTER: Primary | ICD-10-CM

## 2025-08-29 RX ORDER — CELECOXIB 200 MG/1
200 CAPSULE ORAL 2 TIMES DAILY
COMMUNITY

## (undated) DEVICE — BIPOLAR IRRIGATOR INTEGRATED TUBING AND BIPOLAR CORD SET, DISPOSABLE

## (undated) DEVICE — TOOL 14MH30 LEGEND 14CM 3MM: Brand: MIDAS REX ™

## (undated) DEVICE — BIT DRL L12MM DIA2.2MM BLU FLAT CHK FOR ANT CERV PLT SYS

## (undated) DEVICE — GLOVE SURG SZ 75 L12IN FNGR THK79MIL GRN LTX FREE

## (undated) DEVICE — SOLUTION SURG PREP 26 CC PURPREP

## (undated) DEVICE — FCPS BX HOT RJ4 2.2MMX240CM -- RADIAL JAW 4 BX/40

## (undated) DEVICE — SOLUTION IV 250ML 0.9% SOD CHL CLR INJ FLX BG CONT PRT CLSR

## (undated) DEVICE — DRAPE MICSCP W46XL120IN POLY DRAWSTRAP W STEREO OBS TB AND

## (undated) DEVICE — COVER LT HNDL PLAS RIG 1 PER PK

## (undated) DEVICE — LIQUIBAND RAPID ADHESIVE 36/CS 0.8ML: Brand: MEDLINE

## (undated) DEVICE — FLOSEAL WITH RECOTHROM - 10ML.: Brand: FLOSEAL HEMOSTATIC MATRIX

## (undated) DEVICE — BLADE,CARBON-STEEL,11,STRL,DISPOSABLE,TB: Brand: MEDLINE

## (undated) DEVICE — SUTURE ETHILON SZ 3-0 L18IN NONABSORBABLE BLK L24MM PS-1 3/8 1663G

## (undated) DEVICE — BONE WAX WHITE: Brand: BONE WAX WHITE

## (undated) DEVICE — SOLUTION IRRIG 1000ML 09% SOD CHL USP PIC PLAS CONTAINER

## (undated) DEVICE — ASTOUND STANDARD SURGICAL GOWN, XXL: Brand: CONVERTORS

## (undated) DEVICE — TUBING, SUCTION, 1/4" X 10', STRAIGHT: Brand: MEDLINE

## (undated) DEVICE — DRESSING BORDERED ADH GZ UNIV GEN USE 8INX4IN AND 6INX2IN

## (undated) DEVICE — MINOR BASIN -SMH: Brand: MEDLINE INDUSTRIES, INC.

## (undated) DEVICE — DRAPE,HAND,STERILE: Brand: MEDLINE

## (undated) DEVICE — TUBING SUCT 12FR MAL ALUM SHFT FN CAP VENT UNIV CONN W/ OBT

## (undated) DEVICE — ANTERIOR CERVICAL-SMH: Brand: MEDLINE INDUSTRIES, INC.

## (undated) DEVICE — SYRINGE IRRIG 60ML SFT PLIABLE BLB EZ TO GRP 1 HND USE W/

## (undated) DEVICE — SUTURE VICRYL + SZ 2-0 L18IN ABSRB UD CP-2 L26MM 1/2 CIR REV VCP762D

## (undated) DEVICE — ROYALSILK SURGICAL GOWN, L: Brand: CONVERTORS

## (undated) DEVICE — SCREW EXT FIX L14MM FOR DISTRCTN

## (undated) DEVICE — CORD ES L12FT BPLR FRCP

## (undated) DEVICE — HYPODERMIC SAFETY NEEDLE: Brand: MONOJECT

## (undated) DEVICE — PIN FIX THRD FOR SKYLINE ANT CERV PLT SYS
Type: IMPLANTABLE DEVICE | Site: SPINE CERVICAL | Status: NON-FUNCTIONAL
Removed: 2025-01-30

## (undated) DEVICE — X-RAY DETECTABLE SPONGES,16 PLY: Brand: VISTEC

## (undated) DEVICE — KIT EVAC 400CC DIA1/8IN H PAT 12.5IN 3 SPR RND SHP PVC DRN

## (undated) DEVICE — GLOVE SURG SZ 65 L12IN FNGR THK94MIL STD WHT LTX FREE

## (undated) DEVICE — DRAPE SURG W41XL74IN CLR FULL SZ C ARM 3 ADH POLY STRP E

## (undated) DEVICE — DRAPE,REIN 53X77,STERILE: Brand: MEDLINE

## (undated) DEVICE — SUTURE MONOCRYL + SZ 4-0 L27IN ABSRB UD L19MM PS-2 3/8 CIR MCP426H

## (undated) DEVICE — SPONGE GZ W4XL4IN COT 12 PLY TYP VII WVN C FLD DSGN STERILE

## (undated) DEVICE — DRESSING GZ W1XL8IN COT XRFRM N ADH OVERWRAP CURAD

## (undated) DEVICE — BANDAGE COMPR W2INXL5YD WHT BGE POLY COT M E WRP WV HK AND

## (undated) DEVICE — COVER,TABLE,HEAVY DUTY,60"X90",STRL: Brand: MEDLINE

## (undated) DEVICE — XTW CERVICAL COLLAR: Brand: DEROYAL

## (undated) DEVICE — SOLUTION IRRIG 1000ML 0.9% SOD CHL USP POUR PLAS BTL

## (undated) DEVICE — STERILE POLYISOPRENE POWDER-FREE SURGICAL GLOVES: Brand: PROTEXIS

## (undated) DEVICE — BANDAGE GZ W2XL75IN ST RAYON POLY CNFRM STRTCH LTWT

## (undated) DEVICE — SYRINGE 20ML LL S/C 50

## (undated) DEVICE — GLOVE SURG SZ 65 L12IN FNGR THK79MIL GRN LTX FREE

## (undated) DEVICE — REM POLYHESIVE ADULT PATIENT RETURN ELECTRODE: Brand: VALLEYLAB

## (undated) DEVICE — TUBING, SUCTION, 1/4" X 12', STRAIGHT: Brand: MEDLINE